# Patient Record
Sex: MALE | Race: WHITE
[De-identification: names, ages, dates, MRNs, and addresses within clinical notes are randomized per-mention and may not be internally consistent; named-entity substitution may affect disease eponyms.]

---

## 2017-02-06 ENCOUNTER — HOSPITAL ENCOUNTER (INPATIENT)
Dept: HOSPITAL 61 - ER | Age: 67
LOS: 3 days | Discharge: HOME | DRG: 189 | End: 2017-02-09
Attending: FAMILY MEDICINE | Admitting: FAMILY MEDICINE
Payer: COMMERCIAL

## 2017-02-06 VITALS — SYSTOLIC BLOOD PRESSURE: 136 MMHG | DIASTOLIC BLOOD PRESSURE: 73 MMHG

## 2017-02-06 VITALS — WEIGHT: 211.25 LBS | BODY MASS INDEX: 30.24 KG/M2 | HEIGHT: 70 IN

## 2017-02-06 DIAGNOSIS — Z88.1: ICD-10-CM

## 2017-02-06 DIAGNOSIS — Z88.8: ICD-10-CM

## 2017-02-06 DIAGNOSIS — Z79.4: ICD-10-CM

## 2017-02-06 DIAGNOSIS — I42.9: ICD-10-CM

## 2017-02-06 DIAGNOSIS — J96.01: Primary | ICD-10-CM

## 2017-02-06 DIAGNOSIS — F41.9: ICD-10-CM

## 2017-02-06 DIAGNOSIS — K21.9: ICD-10-CM

## 2017-02-06 DIAGNOSIS — Z79.01: ICD-10-CM

## 2017-02-06 DIAGNOSIS — Z95.1: ICD-10-CM

## 2017-02-06 DIAGNOSIS — E11.9: ICD-10-CM

## 2017-02-06 DIAGNOSIS — E78.00: ICD-10-CM

## 2017-02-06 DIAGNOSIS — F32.9: ICD-10-CM

## 2017-02-06 DIAGNOSIS — Z91.040: ICD-10-CM

## 2017-02-06 DIAGNOSIS — I48.91: ICD-10-CM

## 2017-02-06 DIAGNOSIS — Z72.0: ICD-10-CM

## 2017-02-06 DIAGNOSIS — Z98.890: ICD-10-CM

## 2017-02-06 DIAGNOSIS — Z80.6: ICD-10-CM

## 2017-02-06 DIAGNOSIS — E78.5: ICD-10-CM

## 2017-02-06 DIAGNOSIS — I50.9: ICD-10-CM

## 2017-02-06 DIAGNOSIS — J44.1: ICD-10-CM

## 2017-02-06 DIAGNOSIS — I25.10: ICD-10-CM

## 2017-02-06 DIAGNOSIS — Z82.49: ICD-10-CM

## 2017-02-06 DIAGNOSIS — I11.0: ICD-10-CM

## 2017-02-06 LAB
ALBUMIN SERPL-MCNC: 3.8 G/DL (ref 3.4–5)
ALP SERPL-CCNC: 76 U/L (ref 46–116)
ALT SERPL-CCNC: 23 U/L (ref 16–63)
ANION GAP SERPL CALC-SCNC: 12 MMOL/L (ref 6–14)
AST SERPL-CCNC: 26 U/L (ref 15–37)
BACTERIA #/AREA URNS HPF: 0 /HPF
BASOPHILS # BLD AUTO: 0.1 X10^3/UL (ref 0–0.2)
BASOPHILS NFR BLD: 1 % (ref 0–3)
BILIRUB DIRECT SERPL-MCNC: 0.2 MG/DL (ref 0–0.2)
BILIRUB SERPL-MCNC: 0.8 MG/DL (ref 0.2–1)
BILIRUB UR QL STRIP: NEGATIVE
BUN SERPL-MCNC: 20 MG/DL (ref 8–26)
CALCIUM SERPL-MCNC: 8.7 MG/DL (ref 8.5–10.1)
CHLORIDE SERPL-SCNC: 97 MMOL/L (ref 98–107)
CO2 SERPL-SCNC: 26 MMOL/L (ref 21–32)
CREAT SERPL-MCNC: 1.3 MG/DL (ref 0.7–1.3)
EOSINOPHIL NFR BLD: 5 % (ref 0–3)
ERYTHROCYTE [DISTWIDTH] IN BLOOD BY AUTOMATED COUNT: 17.4 % (ref 11.5–14.5)
GFR SERPLBLD BASED ON 1.73 SQ M-ARVRAT: 54.9 ML/MIN
GLUCOSE SERPL-MCNC: 162 MG/DL (ref 70–99)
GLUCOSE UR STRIP-MCNC: NEGATIVE MG/DL
HCT VFR BLD CALC: 41 % (ref 39–53)
HGB BLD-MCNC: 13.9 G/DL (ref 13–17.5)
LYMPHOCYTES # BLD: 0.8 X10^3/UL (ref 1–4.8)
LYMPHOCYTES NFR BLD AUTO: 9 % (ref 24–48)
MCH RBC QN AUTO: 28 PG (ref 25–35)
MCHC RBC AUTO-ENTMCNC: 34 G/DL (ref 31–37)
MCV RBC AUTO: 83 FL (ref 79–100)
MONOCYTES NFR BLD: 11 % (ref 0–9)
NEUTROPHILS NFR BLD AUTO: 74 % (ref 31–73)
NITRITE UR QL STRIP: NEGATIVE
PH UR STRIP: 6 [PH]
PLATELET # BLD AUTO: 196 X10^3/UL (ref 140–400)
POTASSIUM SERPL-SCNC: 4 MMOL/L (ref 3.5–5.1)
PROT SERPL-MCNC: 7.4 G/DL (ref 6.4–8.2)
PROT UR STRIP-MCNC: NEGATIVE MG/DL
RBC # BLD AUTO: 4.96 X10^6/UL (ref 4.3–5.7)
RBC #/AREA URNS HPF: 0 /HPF (ref 0–2)
SODIUM SERPL-SCNC: 135 MMOL/L (ref 136–145)
SP GR UR STRIP: <=1.005
SQUAMOUS #/AREA URNS LPF: (no result) /LPF
UROBILINOGEN UR-MCNC: 1 MG/DL
WBC # BLD AUTO: 9.4 X10^3/UL (ref 4–11)
WBC #/AREA URNS HPF: (no result) /HPF (ref 0–4)

## 2017-02-06 PROCEDURE — G0379 DIRECT REFER HOSPITAL OBSERV: HCPCS

## 2017-02-06 PROCEDURE — 80076 HEPATIC FUNCTION PANEL: CPT

## 2017-02-06 PROCEDURE — 85007 BL SMEAR W/DIFF WBC COUNT: CPT

## 2017-02-06 PROCEDURE — 81001 URINALYSIS AUTO W/SCOPE: CPT

## 2017-02-06 PROCEDURE — 94250: CPT

## 2017-02-06 PROCEDURE — 83880 ASSAY OF NATRIURETIC PEPTIDE: CPT

## 2017-02-06 PROCEDURE — 94620: CPT

## 2017-02-06 PROCEDURE — 94760 N-INVAS EAR/PLS OXIMETRY 1: CPT

## 2017-02-06 PROCEDURE — 36415 COLL VENOUS BLD VENIPUNCTURE: CPT

## 2017-02-06 PROCEDURE — G0378 HOSPITAL OBSERVATION PER HR: HCPCS

## 2017-02-06 PROCEDURE — 84484 ASSAY OF TROPONIN QUANT: CPT

## 2017-02-06 PROCEDURE — 82947 ASSAY GLUCOSE BLOOD QUANT: CPT

## 2017-02-06 PROCEDURE — 80048 BASIC METABOLIC PNL TOTAL CA: CPT

## 2017-02-06 PROCEDURE — 94640 AIRWAY INHALATION TREATMENT: CPT

## 2017-02-06 PROCEDURE — 71250 CT THORAX DX C-: CPT

## 2017-02-06 PROCEDURE — 85027 COMPLETE CBC AUTOMATED: CPT

## 2017-02-06 PROCEDURE — 83690 ASSAY OF LIPASE: CPT

## 2017-02-06 PROCEDURE — 71010: CPT

## 2017-02-06 PROCEDURE — 93005 ELECTROCARDIOGRAM TRACING: CPT

## 2017-02-06 PROCEDURE — 87804 INFLUENZA ASSAY W/OPTIC: CPT

## 2017-02-06 RX ADMIN — IPRATROPIUM BROMIDE AND ALBUTEROL SULFATE SCH ML: .5; 3 SOLUTION RESPIRATORY (INHALATION) at 19:47

## 2017-02-06 NOTE — PHYS DOC
Past Medical History


Past Medical History:  A-Fib, Anxiety, CHF, COPD, Depression, Diabetes-Type II, 

High Cholesterol, Hypertension


Past Surgical History:  Coronary Bypass Surgery, Other


Additional Past Surgical Histo:  bladder, hernia, rotator cuff


Alcohol Use:  None


Drug Use:  None





Adult General


Chief Complaint


Chief Complaint:  SHORTNESS OF BREATH





HPI


HPI


66-year-old male presenting to the emergency department today with worsening 

shortness of breath and cough over 3 days. He has a history of COPD but denies 

fevers or chills. Worse with exertion. He has a history of coronary artery 

disease. Location lungs. Duration intermittent. No alleviating factors present. 

Exacerbated by walking.





Review of systems is negative for chest pain abdominal pain nausea vomiting. He 

denies fevers or chills. All other review of systems is negative unless 

otherwise noted in history of present illness.





Review of Systems


Review of Systems


SEE ABOVE.





Current Medications


Current Medications





 Current Medications








 Medications


  (Trade)  Dose


 Ordered  Sig/Miguel Angel  Start Time


 Stop Time Status Last Admin


Dose Admin


 


 Albuterol/


 Ipratropium


  (Duoneb)  3 ml  RTQID  2/6/17 20:00


 2/7/17 19:59  2/6/17 19:47


3 ML


 


 Morphine Sulfate  2 mg  PRN Q2HR  PRN  2/6/17 19:30


 2/7/17 19:29   


 


 


 Nitroglycerin


  (Nitrostat)  0.4 mg  PRN Q5MIN  PRN  2/6/17 19:30


 2/7/17 19:29   


 


 


 Ondansetron HCl


  (Zofran)  4 mg  PRN Q8HRS  PRN  2/6/17 19:30


 2/7/17 19:29   


 


 


 Prednisone


  (Prednisone)  50 mg  1X  ONCE  2/6/17 19:30


 2/6/17 19:31 DC 2/6/17 19:45


50 MG











Allergies


Allergies





 Allergies








Coded Allergies Type Severity Reaction Last Updated Verified


 


  acetaminophen Allergy Intermediate  1/5/15 No


 


  hydrocodone Allergy Intermediate  1/5/15 No


 


  latex Allergy Intermediate itch 1/5/15 No


 


  oxycodone Allergy Intermediate  1/5/15 No











Physical Exam


Physical Exam





Constitutional: Well developed, well nourished, no acute distress, non-toxic 

appearance. 


HENT: Normocephalic, atraumatic, bilateral external ears normal, oropharynx 

moist, no oral exudates, nose normal. []


Eyes: PERRLA, EOMI, conjunctiva normal, no discharge. [] 


Neck: Normal range of motion, no tenderness, supple, no stridor. [] 


Cardiovascular:Heart rate regular rhythm, no murmur []


Lungs & Thorax:  Patient has diffuse wheezing bilaterally with out crackles 

present.


Abdomen: Bowel sounds normal, soft, no tenderness, no masses, no pulsatile 

masses.  


Skin: Warm, dry, no erythema, no rash.  


Back: No tenderness, no CVA tenderness. [] 


Extremities: No tenderness, no cyanosis, no clubbing, ROM intact, no edema.  


Neurologic: Alert and oriented X 3, normal motor function, normal sensory 

function, no focal deficits noted. []


Psychologic: Affect normal, judgement normal, mood normal.





Current Patient Data


Vital Signs





 Vital Signs








  Date Time  Temp Pulse Resp B/P Pulse Ox O2 Delivery O2 Flow Rate FiO2


 


2/6/17 18:18 98.2 76 26 187/81 79 Room Air  





 98.2       








Lab Values





 Laboratory Tests








Test


  2/6/17


18:30


 


White Blood Count


  9.4x10^3/uL


(4.0-11.0)


 


Red Blood Count


  4.96x10^6/uL


(4.30-5.70)


 


Hemoglobin


  13.9g/dL


(13.0-17.5)


 


Hematocrit


  41.0%


(39.0-53.0)


 


Mean Corpuscular Volume 83fL ()  


 


Mean Corpuscular Hemoglobin 28pg (25-35)  


 


Mean Corpuscular Hemoglobin


Concent 34g/dL (31-37)


 


 


Red Cell Distribution Width


  17.4%


(11.5-14.5)  H


 


Platelet Count


  196x10^3/uL


(140-400)


 


Neutrophils (%) (Auto) 74% (31-73)  H


 


Lymphocytes (%) (Auto) 9% (24-48)  L


 


Monocytes (%) (Auto) 11% (0-9)  H


 


Eosinophils (%) (Auto) 5% (0-3)  H


 


Basophils (%) (Auto) 1% (0-3)  


 


Neutrophils # (Auto)


  7.0x10^3uL


(1.8-7.7)


 


Lymphocytes # (Auto)


  0.8x10^3/uL


(1.0-4.8)  L


 


Monocytes # (Auto)


  1.0x10^3/uL


(0.0-1.1)


 


Eosinophils # (Auto)


  0.4x10^3/uL


(0.0-0.7)


 


Basophils # (Auto)


  0.1x10^3/uL


(0.0-0.2)


 


Sodium Level


  135mmol/L


(136-145)  L


 


Potassium Level


  4.0mmol/L


(3.5-5.1)


 


Chloride Level


  97mmol/L


()  L


 


Carbon Dioxide Level


  26mmol/L


(21-32)


 


Anion Gap 12 (6-14)  


 


Blood Urea Nitrogen


  20mg/dL (8-26)


 


 


Creatinine


  1.3mg/dL


(0.7-1.3)


 


Estimated GFR


(Cockcroft-Gault) 54.9  


 


 


Glucose Level


  162mg/dL


(70-99)  H


 


Calcium Level


  8.7mg/dL


(8.5-10.1)


 


Total Bilirubin


  0.8mg/dL


(0.2-1.0)


 


Direct Bilirubin


  0.2mg/dL


(0.0-0.2)


 


Aspartate Amino Transferase


(AST) 26U/L (15-37)  


 


 


Alanine Aminotransferase (ALT) 23U/L (16-63)  


 


Alkaline Phosphatase


  76U/L ()


 


 


Troponin I Quantitative


  < 0.017ng/mL


(0.000-0.055)


 


NT-Pro-B-Type Natriuretic


Peptide 854pg/mL


(0-124)  H


 


Total Protein


  7.4g/dL


(6.4-8.2)


 


Albumin


  3.8g/dL


(3.4-5.0)


 


Lipase


  66U/L ()


L





 Laboratory Tests


2/6/17 18:30








 Laboratory Tests


2/6/17 18:30














EKG


EKG


[] EKG shows sinus rhythm with a regular rate. Axis is leftward. Intervals are 

within normal limits. Patient has mild repolarization in lead V3 likely 

secondary to deep S wave.





Radiology/Procedures


Radiology/Procedures


[] Chest x-ray shows no acute obvious infiltrate or pneumothorax present.





Course & Med Decision Making


Course & Med Decision Making


Pertinent Labs and Imaging studies reviewed. (See chart for details)





[] 58-year-old male presenting to the emergency department with shortness of 

breath with wheezing on exam. Afebrile with normal heart rate. Patient was 

given DuoNeb therapy in the emergency department which improved his breathing 

mildly however he remained hypoxic and was subsequently admitted for further 

evaluation workup and care. EKG not suggestive of ischemia. Chest x-ray 

unremarkable. Blood work shows normal CBC. Chemistry panel shows mild elevation 

in proBNP. Troponin negative.





Dragon Disclaimer


Dragon Disclaimer


This electronic medical record was generated, in whole or in part, using a 

voice recognition dictation system.





Departure


Departure


Impression:  


 Primary Impression:  


 COPD exacerbation


Disposition:  09 ADMITTED AS INPATIENT


Admitting Physician:  Sai Hugo


Condition:  STABLE


Referrals:  


SAI HUGO MD (PCP)








ABDOUL HAMLIN MD Feb 6, 2017 20:02

## 2017-02-07 VITALS — DIASTOLIC BLOOD PRESSURE: 74 MMHG | SYSTOLIC BLOOD PRESSURE: 158 MMHG

## 2017-02-07 VITALS — SYSTOLIC BLOOD PRESSURE: 150 MMHG | DIASTOLIC BLOOD PRESSURE: 97 MMHG

## 2017-02-07 VITALS — SYSTOLIC BLOOD PRESSURE: 142 MMHG | DIASTOLIC BLOOD PRESSURE: 73 MMHG

## 2017-02-07 VITALS — DIASTOLIC BLOOD PRESSURE: 83 MMHG | SYSTOLIC BLOOD PRESSURE: 162 MMHG

## 2017-02-07 VITALS — DIASTOLIC BLOOD PRESSURE: 67 MMHG | SYSTOLIC BLOOD PRESSURE: 146 MMHG

## 2017-02-07 VITALS — DIASTOLIC BLOOD PRESSURE: 74 MMHG | SYSTOLIC BLOOD PRESSURE: 162 MMHG

## 2017-02-07 LAB
ANION GAP SERPL CALC-SCNC: 11 MMOL/L (ref 6–14)
ANISOCYTOSIS BLD QL SMEAR: SLIGHT
BASOPHILS # BLD AUTO: 0.1 X10^3/UL (ref 0–0.2)
BASOPHILS NFR BLD AUTO: 1 % (ref 0–3)
BASOPHILS NFR BLD: 1 % (ref 0–3)
BUN SERPL-MCNC: 21 MG/DL (ref 8–26)
CALCIUM SERPL-MCNC: 8.9 MG/DL (ref 8.5–10.1)
CHLORIDE SERPL-SCNC: 97 MMOL/L (ref 98–107)
CO2 SERPL-SCNC: 28 MMOL/L (ref 21–32)
CREAT SERPL-MCNC: 1.3 MG/DL (ref 0.7–1.3)
EOSINOPHIL NFR BLD: 0 % (ref 0–3)
ERYTHROCYTE [DISTWIDTH] IN BLOOD BY AUTOMATED COUNT: 17.8 % (ref 11.5–14.5)
GFR SERPLBLD BASED ON 1.73 SQ M-ARVRAT: 54.9 ML/MIN
GLUCOSE SERPL-MCNC: 287 MG/DL (ref 70–99)
HCT VFR BLD CALC: 42.6 % (ref 39–53)
HGB BLD-MCNC: 14.3 G/DL (ref 13–17.5)
LYMPHOCYTES # BLD: 0.3 X10^3/UL (ref 1–4.8)
LYMPHOCYTES NFR BLD AUTO: 5 % (ref 24–48)
MCH RBC QN AUTO: 28 PG (ref 25–35)
MCHC RBC AUTO-ENTMCNC: 34 G/DL (ref 31–37)
MCV RBC AUTO: 83 FL (ref 79–100)
MONOCYTES NFR BLD: 2 % (ref 0–9)
NEUTROPHILS NFR BLD AUTO: 92 % (ref 31–73)
OBC FLU: (no result)
PLATELET # BLD AUTO: 194 X10^3/UL (ref 140–400)
PLATELET # BLD EST: ADEQUATE 10*3/UL
POTASSIUM SERPL-SCNC: 4.3 MMOL/L (ref 3.5–5.1)
RBC # BLD AUTO: 5.12 X10^6/UL (ref 4.3–5.7)
SODIUM SERPL-SCNC: 136 MMOL/L (ref 136–145)
WBC # BLD AUTO: 7 X10^3/UL (ref 4–11)

## 2017-02-07 RX ADMIN — PANTOPRAZOLE SODIUM SCH MG: 40 TABLET, DELAYED RELEASE ORAL at 09:03

## 2017-02-07 RX ADMIN — METFORMIN HYDROCHLORIDE SCH MG: 500 TABLET, FILM COATED ORAL at 21:13

## 2017-02-07 RX ADMIN — TAMSULOSIN HYDROCHLORIDE SCH MG: 0.4 CAPSULE ORAL at 21:13

## 2017-02-07 RX ADMIN — INSULIN ASPART SCH UNITS: 100 INJECTION, SOLUTION INTRAVENOUS; SUBCUTANEOUS at 17:00

## 2017-02-07 RX ADMIN — LISINOPRIL SCH MG: 40 TABLET ORAL at 09:07

## 2017-02-07 RX ADMIN — IPRATROPIUM BROMIDE AND ALBUTEROL SULFATE SCH ML: .5; 3 SOLUTION RESPIRATORY (INHALATION) at 16:45

## 2017-02-07 RX ADMIN — INSULIN ASPART SCH UNITS: 100 INJECTION, SOLUTION INTRAVENOUS; SUBCUTANEOUS at 13:42

## 2017-02-07 RX ADMIN — ATORVASTATIN CALCIUM SCH MG: 10 TABLET, FILM COATED ORAL at 21:13

## 2017-02-07 RX ADMIN — ALPRAZOLAM PRN MG: 0.5 TABLET ORAL at 00:32

## 2017-02-07 RX ADMIN — ALPRAZOLAM PRN MG: 0.5 TABLET ORAL at 21:13

## 2017-02-07 RX ADMIN — METFORMIN HYDROCHLORIDE SCH MG: 500 TABLET, FILM COATED ORAL at 13:37

## 2017-02-07 RX ADMIN — METFORMIN HYDROCHLORIDE SCH MG: 500 TABLET, FILM COATED ORAL at 09:05

## 2017-02-07 RX ADMIN — DILTIAZEM HYDROCHLORIDE SCH MG: 180 CAPSULE, EXTENDED RELEASE ORAL at 09:05

## 2017-02-07 RX ADMIN — Medication SCH MG: at 09:06

## 2017-02-07 RX ADMIN — DILTIAZEM HYDROCHLORIDE SCH MG: 180 CAPSULE, EXTENDED RELEASE ORAL at 21:13

## 2017-02-07 RX ADMIN — SERTRALINE HYDROCHLORIDE SCH MG: 50 TABLET ORAL at 09:07

## 2017-02-07 RX ADMIN — IPRATROPIUM BROMIDE AND ALBUTEROL SULFATE SCH ML: .5; 3 SOLUTION RESPIRATORY (INHALATION) at 09:15

## 2017-02-07 RX ADMIN — AMLODIPINE BESYLATE SCH MG: 5 TABLET ORAL at 10:23

## 2017-02-07 RX ADMIN — ARIPIPRAZOLE SCH MG: 2 TABLET ORAL at 09:04

## 2017-02-07 RX ADMIN — ASPIRIN 81 MG CHEWABLE TABLET SCH MG: 81 TABLET CHEWABLE at 09:05

## 2017-02-07 RX ADMIN — IPRATROPIUM BROMIDE AND ALBUTEROL SULFATE SCH ML: .5; 3 SOLUTION RESPIRATORY (INHALATION) at 19:55

## 2017-02-07 RX ADMIN — INSULIN DETEMIR SCH UNITS: 100 INJECTION, SOLUTION SUBCUTANEOUS at 21:13

## 2017-02-07 RX ADMIN — IPRATROPIUM BROMIDE AND ALBUTEROL SULFATE SCH ML: .5; 3 SOLUTION RESPIRATORY (INHALATION) at 12:08

## 2017-02-07 RX ADMIN — ALPRAZOLAM PRN MG: 0.5 TABLET ORAL at 10:23

## 2017-02-07 NOTE — EKG
[facility]

               [address] [place] [address]

Test Date:    2017               Test Time:    18:20:51

Pat Name:     URIAH GARCÍA             Department:   

Patient ID:   [id number]           Room:         [facility]

Gender:       M                        Technician:   

:          [id number]               Requested By: ABDOUL HAMLIN

Order Number: [id number]            Reading MD:   Ivory Catherine

                                 Measurements

Intervals                              Axis          

Rate:         75                       P:            

NC:                                    QRS:          -11

QRSD:         98                       T:            126

QT:           372                                    

QTc:          418                                    

                           Interpretive Statements

ATRIAL FIBRILLATION

LEFTWARD AXIS

 ABNORMAL ECG





Electronically Signed On 2017 14:29:05 CST by Ivory Catherine

## 2017-02-07 NOTE — PDOC
PROGRESS NOTES


Subjective


Subjective


Pt awake and pleasant this am. States his SOB has improved from yesterday. Pt 

states he is eating and drinking well with good output.





Objective


Objective


Pt awake and alert. NAD. VSS. Afebrile. Lungs diminished. Expiratory wheeze 

resolved. Heart with RRR. No murmurs.


 Vital Signs








  Date Time  Temp Pulse Resp B/P Pulse Ox O2 Delivery O2 Flow Rate FiO2


 


2/7/17 10:23  81  150/97    


 


2/7/17 08:26     96 Nasal Cannula 3.0 


 


2/7/17 07:00 97.5  18     





 97.5       














 Intake and Output 


 


 2/7/17





 07:00


 


Intake Total 840 ml


 


Balance 840 ml


 


 


 


Intake Oral 840 ml


 


# Voids 1











Assessment


Assessment


 Problems


Medical Problems:


(1) COPD exacerbation


Status: Acute  





(2) SOB (shortness of breath)


Status: Acute  











Plan


Plan of Care


1. COPD exacerbation


   -Pulmonary consulted


   -CXR: No acute finding apparent in the chest


   -WBC 7.0


   -Prednisone 50mg x1 in ER


   -Duoneb q6h


   -O2 per NC





2. Cardiomyapathy, follows Dr Carlton as outpt


   -Cardiology consulted


   -Home meds restarted


   


3. DM


   -FSBS 335 this am


   -Home meds restarted





Comment


Review of Relevant


I have reviewed the following items angi (where applicable) has been applied.


Labs





Laboratory Tests








Test


  2/6/17


18:30 2/6/17


19:41 2/6/17


22:54 2/7/17


01:32


 


White Blood Count


  9.4x10^3/uL


(4.0-11.0) 


  


  7.0x10^3/uL


(4.0-11.0)


 


Red Blood Count


  4.96x10^6/uL


(4.30-5.70) 


  


  5.12x10^6/uL


(4.30-5.70)


 


Hemoglobin


  13.9g/dL


(13.0-17.5) 


  


  14.3g/dL


(13.0-17.5)


 


Hematocrit


  41.0%


(39.0-53.0) 


  


  42.6%


(39.0-53.0)


 


Mean Corpuscular Volume 83fL ()    83fL () 


 


Mean Corpuscular Hemoglobin 28pg (25-35)    28pg (25-35) 


 


Mean Corpuscular Hemoglobin


Concent 34g/dL (31-37) 


  


  


  34g/dL (31-37) 


 


 


Red Cell Distribution Width


  17.4%


(11.5-14.5) 


  


  17.8%


(11.5-14.5)


 


Platelet Count


  196x10^3/uL


(140-400) 


  


  194x10^3/uL


(140-400)


 


Neutrophils (%) (Auto) 74% (31-73)    92% (31-73) 


 


Lymphocytes (%) (Auto) 9% (24-48)    5% (24-48) 


 


Monocytes (%) (Auto) 11% (0-9)    2% (0-9) 


 


Eosinophils (%) (Auto) 5% (0-3)    0% (0-3) 


 


Basophils (%) (Auto) 1% (0-3)    1% (0-3) 


 


Neutrophils # (Auto)


  7.0x10^3uL


(1.8-7.7) 


  


  6.4x10^3uL


(1.8-7.7)


 


Lymphocytes # (Auto)


  0.8x10^3/uL


(1.0-4.8) 


  


  0.3x10^3/uL


(1.0-4.8)


 


Monocytes # (Auto)


  1.0x10^3/uL


(0.0-1.1) 


  


  0.1x10^3/uL


(0.0-1.1)


 


Eosinophils # (Auto)


  0.4x10^3/uL


(0.0-0.7) 


  


  0.0x10^3/uL


(0.0-0.7)


 


Basophils # (Auto)


  0.1x10^3/uL


(0.0-0.2) 


  


  0.1x10^3/uL


(0.0-0.2)


 


Sodium Level


  135mmol/L


(136-145) 


  


  136mmol/L


(136-145)


 


Potassium Level


  4.0mmol/L


(3.5-5.1) 


  


  4.3mmol/L


(3.5-5.1)


 


Chloride Level


  97mmol/L


() 


  


  97mmol/L


()


 


Carbon Dioxide Level


  26mmol/L


(21-32) 


  


  28mmol/L


(21-32)


 


Anion Gap 12 (6-14)    11 (6-14) 


 


Blood Urea Nitrogen 20mg/dL (8-26)    21mg/dL (8-26) 


 


Creatinine


  1.3mg/dL


(0.7-1.3) 


  


  1.3mg/dL


(0.7-1.3)


 


Estimated GFR


(Cockcroft-Gault) 54.9 


  


  


  54.9 


 


 


Glucose Level


  162mg/dL


(70-99) 


  


  287mg/dL


(70-99)


 


Calcium Level


  8.7mg/dL


(8.5-10.1) 


  


  8.9mg/dL


(8.5-10.1)


 


Total Bilirubin


  0.8mg/dL


(0.2-1.0) 


  


  


 


 


Direct Bilirubin


  0.2mg/dL


(0.0-0.2) 


  


  


 


 


Aspartate Amino Transf


(AST/SGOT) 26U/L (15-37) 


  


  


  


 


 


Alanine Aminotransferase


(ALT/SGPT) 23U/L (16-63) 


  


  


  


 


 


Alkaline Phosphatase 76U/L ()    


 


Troponin I Quantitative


  < 0.017ng/mL


(0.000-0.055) 


  


  < 0.017ng/mL


(0.000-0.055)


 


NT-Pro-B-Type Natriuretic


Peptide 854pg/mL


(0-124) 


  


  


 


 


Total Protein


  7.4g/dL


(6.4-8.2) 


  


  


 


 


Albumin


  3.8g/dL


(3.4-5.0) 


  


  


 


 


Lipase 66U/L ()    


 


Urine Color  Yellow   


 


Urine Clarity  Clear   


 


Urine pH  6.0   


 


Urine Specific Gravity  <=1.005   


 


Urine Protein


  


  Negativemg/dL


(NEG-TRACE) 


  


 


 


Urine Glucose (UA)


  


  Negativemg/dL


(NEG) 


  


 


 


Urine Ketones (Stick)


  


  Negativemg/dL


(NEG) 


  


 


 


Urine Blood  Negative (NEG)   


 


Urine Nitrite  Negative (NEG)   


 


Urine Bilirubin  Negative (NEG)   


 


Urine Urobilinogen Dipstick


  


  1.0mg/dL (0.2


mg/dL) 


  


 


 


Urine Leukocyte Esterase  Negative (NEG)   


 


Urine RBC  0/HPF (0-2)   


 


Urine WBC  Occ/HPF (0-4)   


 


Urine Squamous Epithelial


Cells 


  Occ/LPF 


  


  


 


 


Urine Bacteria  0/HPF (0-FEW)   


 


Glucose (Fingerstick)


  


  


  138mg/dL


(70-99) 


 


 


Segmented Neutrophils %    80% (35-66) 


 


Band Neutrophils %    4% (0-9) 


 


Lymphocytes %    10% (24-48) 


 


Monocytes %    5% (0-10) 


 


Basophils %    1% (0-3) 


 


Platelet Estimate


  


  


  


  Adequate


(ADEQUATE)


 


Anisocytosis    Slight 














Test


  2/7/17


07:28 2/7/17


07:54 2/7/17


09:01 


 


 


Troponin I Quantitative


  < 0.017ng/mL


(0.000-0.055) 


  


  


 


 


Glucose (Fingerstick)


  


  335mg/dL


(70-99) 


  


 


 


Influenza Type A Antigen


  


  


  Negative


(NEGATIVE) 


 


 


Influenza Type B Antigen


  


  


  Negative


(NEGATIVE) 


 








Laboratory Tests








Test


  2/6/17


18:30 2/6/17


19:41 2/6/17


22:54 2/7/17


01:32


 


White Blood Count


  9.4x10^3/uL


(4.0-11.0) 


  


  7.0x10^3/uL


(4.0-11.0)


 


Red Blood Count


  4.96x10^6/uL


(4.30-5.70) 


  


  5.12x10^6/uL


(4.30-5.70)


 


Hemoglobin


  13.9g/dL


(13.0-17.5) 


  


  14.3g/dL


(13.0-17.5)


 


Hematocrit


  41.0%


(39.0-53.0) 


  


  42.6%


(39.0-53.0)


 


Mean Corpuscular Volume 83fL ()    83fL () 


 


Mean Corpuscular Hemoglobin 28pg (25-35)    28pg (25-35) 


 


Mean Corpuscular Hemoglobin


Concent 34g/dL (31-37) 


  


  


  34g/dL (31-37) 


 


 


Red Cell Distribution Width


  17.4%


(11.5-14.5) 


  


  17.8%


(11.5-14.5)


 


Platelet Count


  196x10^3/uL


(140-400) 


  


  194x10^3/uL


(140-400)


 


Neutrophils (%) (Auto) 74% (31-73)    92% (31-73) 


 


Lymphocytes (%) (Auto) 9% (24-48)    5% (24-48) 


 


Monocytes (%) (Auto) 11% (0-9)    2% (0-9) 


 


Eosinophils (%) (Auto) 5% (0-3)    0% (0-3) 


 


Basophils (%) (Auto) 1% (0-3)    1% (0-3) 


 


Neutrophils # (Auto)


  7.0x10^3uL


(1.8-7.7) 


  


  6.4x10^3uL


(1.8-7.7)


 


Lymphocytes # (Auto)


  0.8x10^3/uL


(1.0-4.8) 


  


  0.3x10^3/uL


(1.0-4.8)


 


Monocytes # (Auto)


  1.0x10^3/uL


(0.0-1.1) 


  


  0.1x10^3/uL


(0.0-1.1)


 


Eosinophils # (Auto)


  0.4x10^3/uL


(0.0-0.7) 


  


  0.0x10^3/uL


(0.0-0.7)


 


Basophils # (Auto)


  0.1x10^3/uL


(0.0-0.2) 


  


  0.1x10^3/uL


(0.0-0.2)


 


Sodium Level


  135mmol/L


(136-145) 


  


  136mmol/L


(136-145)


 


Potassium Level


  4.0mmol/L


(3.5-5.1) 


  


  4.3mmol/L


(3.5-5.1)


 


Chloride Level


  97mmol/L


() 


  


  97mmol/L


()


 


Carbon Dioxide Level


  26mmol/L


(21-32) 


  


  28mmol/L


(21-32)


 


Anion Gap 12 (6-14)    11 (6-14) 


 


Blood Urea Nitrogen 20mg/dL (8-26)    21mg/dL (8-26) 


 


Creatinine


  1.3mg/dL


(0.7-1.3) 


  


  1.3mg/dL


(0.7-1.3)


 


Estimated GFR


(Cockcroft-Gault) 54.9 


  


  


  54.9 


 


 


Glucose Level


  162mg/dL


(70-99) 


  


  287mg/dL


(70-99)


 


Calcium Level


  8.7mg/dL


(8.5-10.1) 


  


  8.9mg/dL


(8.5-10.1)


 


Total Bilirubin


  0.8mg/dL


(0.2-1.0) 


  


  


 


 


Direct Bilirubin


  0.2mg/dL


(0.0-0.2) 


  


  


 


 


Aspartate Amino Transf


(AST/SGOT) 26U/L (15-37) 


  


  


  


 


 


Alanine Aminotransferase


(ALT/SGPT) 23U/L (16-63) 


  


  


  


 


 


Alkaline Phosphatase 76U/L ()    


 


Troponin I Quantitative


  < 0.017ng/mL


(0.000-0.055) 


  


  < 0.017ng/mL


(0.000-0.055)


 


NT-Pro-B-Type Natriuretic


Peptide 854pg/mL


(0-124) 


  


  


 


 


Total Protein


  7.4g/dL


(6.4-8.2) 


  


  


 


 


Albumin


  3.8g/dL


(3.4-5.0) 


  


  


 


 


Lipase 66U/L ()    


 


Urine Color  Yellow   


 


Urine Clarity  Clear   


 


Urine pH  6.0   


 


Urine Specific Gravity  <=1.005   


 


Urine Protein


  


  Negativemg/dL


(NEG-TRACE) 


  


 


 


Urine Glucose (UA)


  


  Negativemg/dL


(NEG) 


  


 


 


Urine Ketones (Stick)


  


  Negativemg/dL


(NEG) 


  


 


 


Urine Blood  Negative (NEG)   


 


Urine Nitrite  Negative (NEG)   


 


Urine Bilirubin  Negative (NEG)   


 


Urine Urobilinogen Dipstick


  


  1.0mg/dL (0.2


mg/dL) 


  


 


 


Urine Leukocyte Esterase  Negative (NEG)   


 


Urine RBC  0/HPF (0-2)   


 


Urine WBC  Occ/HPF (0-4)   


 


Urine Squamous Epithelial


Cells 


  Occ/LPF 


  


  


 


 


Urine Bacteria  0/HPF (0-FEW)   


 


Glucose (Fingerstick)


  


  


  138mg/dL


(70-99) 


 


 


Segmented Neutrophils %    80% (35-66) 


 


Band Neutrophils %    4% (0-9) 


 


Lymphocytes %    10% (24-48) 


 


Monocytes %    5% (0-10) 


 


Basophils %    1% (0-3) 


 


Platelet Estimate


  


  


  


  Adequate


(ADEQUATE)


 


Anisocytosis    Slight 














Test


  2/7/17


07:28 2/7/17


07:54 2/7/17


09:01 


 


 


Troponin I Quantitative


  < 0.017ng/mL


(0.000-0.055) 


  


  


 


 


Glucose (Fingerstick)


  


  335mg/dL


(70-99) 


  


 


 


Influenza Type A Antigen


  


  


  Negative


(NEGATIVE) 


 


 


Influenza Type B Antigen


  


  


  Negative


(NEGATIVE) 


 








Medications





 Current Medications


Albuterol/ Ipratropium (Duoneb) 3 ml 1X  ONCE NEB  Last administered on 2/6/ 17at 19:47;  Start 2/6/17 at 19:30;  Stop 2/6/17 at 19:31;  Status DC


Prednisone (Prednisone) 50 mg 1X  ONCE PO  Last administered on 2/6/17at 19:45;

  Start 2/6/17 at 19:30;  Stop 2/6/17 at 19:31;  Status DC


Ondansetron HCl (Zofran) 4 mg PRN Q8HRS  PRN IV NAUSEA/VOMITING;  Start 2/6/17 

at 19:30;  Stop 2/7/17 at 19:29


Morphine Sulfate 2 mg PRN Q2HR  PRN IV PAIN;  Start 2/6/17 at 19:30;  Stop 2/7/ 17 at 19:29


Nitroglycerin (Nitrostat) 0.4 mg PRN Q5MIN  PRN SL CHEST PAIN;  Start 2/6/17 at 

19:30;  Stop 2/7/17 at 19:29


Albuterol/ Ipratropium (Duoneb) 3 ml RTQID NEB  Last administered on 2/7/17at 09

:15;  Start 2/6/17 at 20:00;  Stop 2/7/17 at 19:59


Alprazolam (Xanax) 0.5 mg PRN Q8HRS  PRN PO ANXIETY / AGITATION Last 

administered on 2/7/17at 10:23;  Start 2/7/17 at 00:30


Aripiprazole (Abilify) 2 mg DAILY PO  Last administered on 2/7/17at 09:04;  

Start 2/7/17 at 09:00


Aspirin (Children'S Aspirin) 81 mg DAILY PO  Last administered on 2/7/17at 09:05

;  Start 2/7/17 at 09:00


Digoxin (Lanoxin) 250 mcg DAILY PO  Last administered on 2/7/17at 09:06;  Start 

2/7/17 at 09:00


Diltiazem HCl (Cardizem 24hr Cd) 180 mg BID PO  Last administered on 2/7/17at 09

:05;  Start 2/7/17 at 09:00


Furosemide (Lasix) 40 mg DAILY PO  Last administered on 2/7/17at 09:06;  Start 2 /7/17 at 09:00


Lisinopril (Prinivil) 40 mg DAILY PO  Last administered on 2/7/17at 09:07;  

Start 2/7/17 at 09:00


Metformin HCl (Glucophage) 500 mg TID PO  Last administered on 2/7/17at 09:05;  

Start 2/7/17 at 09:00


Sertraline HCl (Zoloft) 50 mg DAILY PO  Last administered on 2/7/17at 09:07;  

Start 2/7/17 at 09:00


Insulin Detemir (Levemir) 60 units QHS SQ ;  Start 2/7/17 at 21:00


Pantoprazole Sodium (Protonix) 40 mg DAILYAC PO  Last administered on 2/7/17at 

09:03;  Start 2/7/17 at 07:30


Atorvastatin Calcium (Lipitor) 5 mg QHS PO ;  Start 2/7/17 at 21:00


Tamsulosin HCl (Flomax) 0.4 mg QHS PO ;  Start 2/7/17 at 21:00


Amlodipine Besylate (Norvasc) 5 mg DAILY PO  Last administered on 2/7/17at 10:23

;  Start 2/7/17 at 10:00





Active Scripts


Active


Abilify (Aripiprazole) 2 Mg Tablet 2 Mg PO DAILY


Reported


Amlodipine Besylate 5 Mg Tablet 5 Mg PO DAILY


Lantus Solostar (Insulin Glargine,Hum.rec.anlog) 100 Unit/1 Ml Insuln.pen 60 

Unit SQ QHS


Pravastatin Sodium 20 Mg Tablet 1 Tab PO DAILY


Omeprazole 20 Mg Capsule.dr 1 Cap PO BID


Zoloft (Sertraline Hcl) 50 Mg Tablet 1 Tab PO DAILY


Furosemide 40 Mg Tablet 1 Tab PO DAILY


Cardizem Cd (Diltiazem Hcl) 180 Mg Cap.er.24h 180 Mg PO BID


Digoxin 250 Mcg Tablet 250 Mcg PO DAILY


Proair Hfa Inhaler (Albuterol Sulfate) 8.5 Gm Hfa.aer.ad 8.5 Gm IH PRN TID PRN


Symbicort 160-4.5 Mcg Inhaler (Budesonide/Formoterol Fumarate) 10.2 Gm 

Hfa.aer.ad 10.2 Gm IH BID


NITROGLYCERIN SubLingual (Nitroglycerin) 0.4 Mg Tab.subl 0.4 Mg SL PRN Q5MIN PRN


Alprazolam 0.5 Mg Tab.rapdis 0.5 Mg PO PRN TID PRN


Indigo Chewable (Aspirin) 81 Mg Tab.chew 81 Mg PO DAILY


Metformin Hcl 500 Mg Tablet 500 Mg PO TID


Rapaflo (Silodosin) 8 Mg Capsule 8 Mg PO DAILY


Lisinopril 40 Mg Tablet 40 Mg PO DAILY


Vitals/I & O





 Vital Sign - Last 24 Hours








 2/6/17 2/6/17 2/6/17 2/6/17





 18:18 18:30 19:30 19:50


 


Temp 98.2   





 98.2   


 


Pulse 76 82 80 


 


Resp 26 20 20 


 


B/P 187/81 172/80 171/87 


 


Pulse Ox 79 91 90 90


 


O2 Delivery Room Air Nasal Cannula Nasal Cannula Nasal Cannula


 


O2 Flow Rate  4 4 3.0


 


    





    





 2/6/17 2/6/17 2/6/17 2/6/17





 20:30 21:30 22:00 23:00


 


Pulse 86 82 78 


 


Resp 20 20 18 


 


B/P 159/81 154/72 168/72 


 


Pulse Ox 90 91 93 


 


O2 Delivery Nasal Cannula Room Air Nasal Cannula Nasal Cannula


 


O2 Flow Rate 4  4 5.0





 2/6/17 2/6/17 2/7/17 2/7/17





 23:00 23:00 03:01 07:00


 


Temp 98.5  98.0 97.5





 98.5  98.0 97.5


 


Pulse 87  89 81


 


Resp 20 20 18


 


B/P 136/73  162/83 150/97


 


Pulse Ox 94  94 94


 


O2 Delivery Nasal Cannula Nasal Cannula Nasal Cannula Nasal Cannula


 


O2 Flow Rate  5.0  5.0


 


    





    





 2/7/17 2/7/17 2/7/17 2/7/17





 08:00 08:26 09:05 09:06


 


Pulse   81 81


 


B/P   150/97 150/97


 


Pulse Ox  96  


 


O2 Delivery Nasal Cannula Nasal Cannula  


 


O2 Flow Rate 4.0 3.0  





 2/7/17 2/7/17  





 09:07 10:23  


 


Pulse 81 81  


 


B/P 150/97 150/97  














 Intake and Output   


 


 2/6/17 2/6/17 2/7/17





 15:00 23:00 07:00


 


Intake Total   840 ml


 


Balance   840 ml














YVETTE JENNINGS MD Feb 7, 2017 10:57

## 2017-02-07 NOTE — CONS
DATE OF CONSULTATION:  02/07/2017



ATTENDING PHYSICIAN:  Dr. Sai Hugo.



REASON FOR CONSULTATION:  Dyspnea.



HISTORY OF PRESENT ILLNESS:  The patient is a 66-year-old male, who has smoked

heavily in the past for 35 years up to 3 packs per day.  He did quit in 2009. 

He is not on home oxygen.  He presented to the hospital with complaint of

shortness of breath.  He has a cough and had some sinus drainage.  The patient

states his grandson was ill, was sick with a Strep throat and he got it from

him.  He was having some wheezing as well.  No chest pains.  No leg edema.  No

nausea or vomiting.  No diarrhea.  No headaches.  His chest x-ray was clear.  He

has been hospitalized for further evaluation.



PAST MEDICAL HISTORY:  History of COPD, history of AFib.  He developed

hemoptysis while on anticoagulation and had bronchoscopies in the past.  History

of depression, type 2 diabetes, dyslipidemia, and hypertension.



PAST SURGICAL HISTORY:  Coronary artery bypass surgery; bladder, hernia and

rotator cuff surgery.



ALLERGIES:  ACETAMINOPHEN, HYDROCODONE, LATEX, AND OXYCODONE.



MEDICATIONS:  Were all reviewed as listed in the [facility] including DuoNebs.



REVIEW OF SYSTEMS:  Twelve-point systems was obtained.  Pertinent positives

discussed in history of present illness, otherwise noncontributory.  All systems

that were negative were reviewed as well.



SOCIAL HISTORY:  He smoked for 35 years up to 3 packs a day.  Quit in 2009.



FAMILY HISTORY:  Noncontributory ____.



PHYSICAL EXAMINATION:

VITAL SIGNS:  Blood pressure 150/97.  He is afebrile, pulse ox 94% on 5 liters.

HEENT:  Sclerae nonicteric.

NECK:  Supple.

LUNGS:  With diminished breath sounds with occasional posterior wheeze.

CARDIOVASCULAR:  Regular rate, rhythm.

ABDOMEN:  Soft, obese.

EXTREMITIES:  With no pitting edema.



LABORATORY DATA:  Reviewed.  BUN and creatinine are 21/1.3, blood sugar 335,

white cell count 7.0.



IMPRESSION:

1.  Dyspnea with acute hypoxic respiratory failure secondary to acute

exacerbation of chronic obstructive pulmonary disease.

2.  Thirty-five years of tobacco use up to 3 packs per day.  Suspect underlying

chronic obstructive pulmonary disease.

3.  History of atrial fibrillation, was on anticoagulation, but developed

significant hemostasis, and as a result was taken off.  He had bronchoscopies

done in the past as well.

4.  Rule out influenza.



RECOMMENDATIONS:

1.  Continue to slowly wean oxygen with keeping sats 92% to 94%.

2.  Rule out influenza.

3.  Continue bronchodilators.

4.  We can hold on systemic steroids at present.  Lungs are sounding better.

5.  Weight loss is advised.

6.  We will follow along with you.

 



______________________________

ELEONORA ROUSE MD



DR:  DAISY/tejinder  JOB#:  [id number] / [id number]

DD:  02/07/2017 08:48  DT:  02/07/2017 09:25



Sai Vera

## 2017-02-07 NOTE — PDOC2
CONSULT


Date of Consult


Date of Consult


DATE: 17 


TIME: 10:25





Reason for Consult


Reason for Consult:


CHF





Identification/Chief Complaint


Chief Complaint


Shortness of breath





Source


Source:  Chart review, Patient





History of Present Illness


Reason for Visit:


Mr. Pan is a 67 y/o M who presents with the chief complaint of shortness of 

breath that has lasted for 4 days. He says his dyspnea is worse with exertion 

and only better with O2 therapy. He has a known history of COPD and says this 

feels similar to previous COPD exacerbations. He further reports that he has 

had several episodes like this one since having a CABG in . He has also 

been coughing up dark brown phlegm and endorses some weakness. He denies fevers

, chills, and chest pain.





Past Medical History


Past Medical History


1. A-Fib, 2. MISSY, 3. CHF, 4. COPD, 5. MDD, 6. T2DM, 7. HLD, 8. HTN





Past Surgical History


Past Surgical History


1. CABG in , 2. Hernia repair, 3. Rotator cuff repair.





Family History


Family History


Patient's father  of an MI in  at age 69. His mother  of Leukemia 

in  at the age of 53.





Social History


Quit


ALCOHOL:  none


Drugs:  None





Current Problem List


Problem List


 Problems


Medical Problems:


(1) COPD exacerbation


Status: Acute  





(2) SOB (shortness of breath)


Status: Acute  











Current Medications


Current Medications





 Current Medications


Albuterol/ Ipratropium (Duoneb) 3 ml 1X  ONCE NEB  Last administered on  19:47;  Start 17 at 19:30;  Stop 17 at 19:31;  Status DC


Prednisone (Prednisone) 50 mg 1X  ONCE PO  Last administered on  19:45;

  Start 17 at 19:30;  Stop 17 at 19:31;  Status DC


Ondansetron HCl (Zofran) 4 mg PRN Q8HRS  PRN IV NAUSEA/VOMITING;  Start 17 

at 19:30;  Stop 17 at 19:29


Morphine Sulfate 2 mg PRN Q2HR  PRN IV PAIN;  Start 17 at 19:30;  Stop  at 19:29


Nitroglycerin (Nitrostat) 0.4 mg PRN Q5MIN  PRN SL CHEST PAIN;  Start 17 at 

19:30;  Stop 17 at 19:29


Albuterol/ Ipratropium (Duoneb) 3 ml RTQID NEB  Last administered on  09

:15;  Start 17 at 20:00;  Stop 17 at 19:59


Alprazolam (Xanax) 0.5 mg PRN Q8HRS  PRN PO ANXIETY / AGITATION Last 

administered on  10:23;  Start 17 at 00:30


Aripiprazole (Abilify) 2 mg DAILY PO  Last administered on  09:04;  

Start 17 at 09:00


Aspirin (Children'S Aspirin) 81 mg DAILY PO  Last administered on  09:05

;  Start 17 at 09:00


Digoxin (Lanoxin) 250 mcg DAILY PO  Last administered on  09:06;  Start 

17 at 09:00


Diltiazem HCl (Cardizem 24hr Cd) 180 mg BID PO  Last administered on  09

:05;  Start 17 at 09:00


Furosemide (Lasix) 40 mg DAILY PO  Last administered on  09:06;  Start  at 09:00


Lisinopril (Prinivil) 40 mg DAILY PO  Last administered on  09:07;  

Start 17 at 09:00


Metformin HCl (Glucophage) 500 mg TID PO  Last administered on  09:05;  

Start 17 at 09:00


Sertraline HCl (Zoloft) 50 mg DAILY PO  Last administered on  09:07;  

Start 17 at 09:00


Insulin Detemir (Levemir) 60 units QHS SQ ;  Start 17 at 21:00


Pantoprazole Sodium (Protonix) 40 mg DAILYAC PO  Last administered on  

09:03;  Start 17 at 07:30


Atorvastatin Calcium (Lipitor) 5 mg QHS PO ;  Start 17 at 21:00


Tamsulosin HCl (Flomax) 0.4 mg QHS PO ;  Start 17 at 21:00


Amlodipine Besylate (Norvasc) 5 mg DAILY PO  Last administered on t 10:23

;  Start 17 at 10:00





Active Scripts


Active


Abilify (Aripiprazole) 2 Mg Tablet 2 Mg PO DAILY


Reported


Amlodipine Besylate 5 Mg Tablet 5 Mg PO DAILY


Lantus Solostar (Insulin Glargine,Hum.rec.anlog) 100 Unit/1 Ml Insuln.pen 60 

Unit SQ QHS


Pravastatin Sodium 20 Mg Tablet 1 Tab PO DAILY


Omeprazole 20 Mg Capsule.dr 1 Cap PO BID


Zoloft (Sertraline Hcl) 50 Mg Tablet 1 Tab PO DAILY


Furosemide 40 Mg Tablet 1 Tab PO DAILY


Cardizem Cd (Diltiazem Hcl) 180 Mg Cap.er.24h 180 Mg PO BID


Digoxin 250 Mcg Tablet 250 Mcg PO DAILY


Proair Hfa Inhaler (Albuterol Sulfate) 8.5 Gm Hfa.aer.ad 8.5 Gm IH PRN TID PRN


Symbicort 160-4.5 Mcg Inhaler (Budesonide/Formoterol Fumarate) 10.2 Gm 

Hfa.aer.ad 10.2 Gm IH BID


NITROGLYCERIN SubLingual (Nitroglycerin) 0.4 Mg Tab.subl 0.4 Mg SL PRN Q5MIN PRN


Alprazolam 0.5 Mg Tab.rapdis 0.5 Mg PO PRN TID PRN


Indigo Chewable (Aspirin) 81 Mg Tab.chew 81 Mg PO DAILY


Metformin Hcl 500 Mg Tablet 500 Mg PO TID


Rapaflo (Silodosin) 8 Mg Capsule 8 Mg PO DAILY


Lisinopril 40 Mg Tablet 40 Mg PO DAILY





Allergies


Allergies:  


Coded Allergies:  


     acetaminophen (Unverified  Allergy, Intermediate, 1/5/15)


 DIZZINESS


     hydrocodone (Unverified  Allergy, Intermediate, 1/5/15)


 DIZZINESS


     latex (Unverified  Allergy, Intermediate, itch, 1/5/15)


     oxycodone (Unverified  Allergy, Intermediate, 1/5/15)


 DIZZINESS





ROS


Respiratory:  YES: Cough, SOB with excertion, Sputum Changes


Musculoskeletal:  Yes Muscular Weakness





Physical Exam


Physical Exam


Wheezing b/l on auscultation of lungs, +edema of lower extremities.


Heart:  Regular rate, Normal S1, Normal S2, No murmurs





Vitals


VITALS





 Vital Signs








  Date Time  Temp Pulse Resp B/P Pulse Ox O2 Delivery O2 Flow Rate FiO2


 


17 10:23  81  150/97    


 


17 08:26     96 Nasal Cannula 3.0 


 


17 07:00 97.5  18     





 97.5       











Labs


Labs





Laboratory Tests








Test


  17


18:30 17


19:41 17


22:54 17


01:32


 


White Blood Count


  9.4x10^3/uL


(4.0-11.0) 


  


  7.0x10^3/uL


(4.0-11.0)


 


Red Blood Count


  4.96x10^6/uL


(4.30-5.70) 


  


  5.12x10^6/uL


(4.30-5.70)


 


Hemoglobin


  13.9g/dL


(13.0-17.5) 


  


  14.3g/dL


(13.0-17.5)


 


Hematocrit


  41.0%


(39.0-53.0) 


  


  42.6%


(39.0-53.0)


 


Mean Corpuscular Volume 83fL ()    83fL () 


 


Mean Corpuscular Hemoglobin 28pg (25-35)    28pg (25-35) 


 


Mean Corpuscular Hemoglobin


Concent 34g/dL (31-37) 


  


  


  34g/dL (31-37) 


 


 


Red Cell Distribution Width


  17.4%


(11.5-14.5) 


  


  17.8%


(11.5-14.5)


 


Platelet Count


  196x10^3/uL


(140-400) 


  


  194x10^3/uL


(140-400)


 


Neutrophils (%) (Auto) 74% (31-73)    92% (31-73) 


 


Lymphocytes (%) (Auto) 9% (24-48)    5% (24-48) 


 


Monocytes (%) (Auto) 11% (0-9)    2% (0-9) 


 


Eosinophils (%) (Auto) 5% (0-3)    0% (0-3) 


 


Basophils (%) (Auto) 1% (0-3)    1% (0-3) 


 


Neutrophils # (Auto)


  7.0x10^3uL


(1.8-7.7) 


  


  6.4x10^3uL


(1.8-7.7)


 


Lymphocytes # (Auto)


  0.8x10^3/uL


(1.0-4.8) 


  


  0.3x10^3/uL


(1.0-4.8)


 


Monocytes # (Auto)


  1.0x10^3/uL


(0.0-1.1) 


  


  0.1x10^3/uL


(0.0-1.1)


 


Eosinophils # (Auto)


  0.4x10^3/uL


(0.0-0.7) 


  


  0.0x10^3/uL


(0.0-0.7)


 


Basophils # (Auto)


  0.1x10^3/uL


(0.0-0.2) 


  


  0.1x10^3/uL


(0.0-0.2)


 


Sodium Level


  135mmol/L


(136-145) 


  


  136mmol/L


(136-145)


 


Potassium Level


  4.0mmol/L


(3.5-5.1) 


  


  4.3mmol/L


(3.5-5.1)


 


Chloride Level


  97mmol/L


() 


  


  97mmol/L


()


 


Carbon Dioxide Level


  26mmol/L


(21-32) 


  


  28mmol/L


(21-32)


 


Anion Gap 12 (6-14)    11 (6-14) 


 


Blood Urea Nitrogen 20mg/dL (8-26)    21mg/dL (8-26) 


 


Creatinine


  1.3mg/dL


(0.7-1.3) 


  


  1.3mg/dL


(0.7-1.3)


 


Estimated GFR


(Cockcroft-Gault) 54.9 


  


  


  54.9 


 


 


Glucose Level


  162mg/dL


(70-99) 


  


  287mg/dL


(70-99)


 


Calcium Level


  8.7mg/dL


(8.5-10.1) 


  


  8.9mg/dL


(8.5-10.1)


 


Total Bilirubin


  0.8mg/dL


(0.2-1.0) 


  


  


 


 


Direct Bilirubin


  0.2mg/dL


(0.0-0.2) 


  


  


 


 


Aspartate Amino Transf


(AST/SGOT) 26U/L (15-37) 


  


  


  


 


 


Alanine Aminotransferase


(ALT/SGPT) 23U/L (16-63) 


  


  


  


 


 


Alkaline Phosphatase 76U/L ()    


 


Troponin I Quantitative


  < 0.017ng/mL


(0.000-0.055) 


  


  < 0.017ng/mL


(0.000-0.055)


 


NT-Pro-B-Type Natriuretic


Peptide 854pg/mL


(0-124) 


  


  


 


 


Total Protein


  7.4g/dL


(6.4-8.2) 


  


  


 


 


Albumin


  3.8g/dL


(3.4-5.0) 


  


  


 


 


Lipase 66U/L ()    


 


Urine Color  Yellow   


 


Urine Clarity  Clear   


 


Urine pH  6.0   


 


Urine Specific Gravity  <=1.005   


 


Urine Protein


  


  Negativemg/dL


(NEG-TRACE) 


  


 


 


Urine Glucose (UA)


  


  Negativemg/dL


(NEG) 


  


 


 


Urine Ketones (Stick)


  


  Negativemg/dL


(NEG) 


  


 


 


Urine Blood  Negative (NEG)   


 


Urine Nitrite  Negative (NEG)   


 


Urine Bilirubin  Negative (NEG)   


 


Urine Urobilinogen Dipstick


  


  1.0mg/dL (0.2


mg/dL) 


  


 


 


Urine Leukocyte Esterase  Negative (NEG)   


 


Urine RBC  0/HPF (0-2)   


 


Urine WBC  Occ/HPF (0-4)   


 


Urine Squamous Epithelial


Cells 


  Occ/LPF 


  


  


 


 


Urine Bacteria  0/HPF (0-FEW)   


 


Glucose (Fingerstick)


  


  


  138mg/dL


(70-99) 


 


 


Segmented Neutrophils %    80% (35-66) 


 


Band Neutrophils %    4% (0-9) 


 


Lymphocytes %    10% (24-48) 


 


Monocytes %    5% (0-10) 


 


Basophils %    1% (0-3) 


 


Platelet Estimate


  


  


  


  Adequate


(ADEQUATE)


 


Anisocytosis    Slight 














Test


  17


07:28 17


07:54 17


09:01 


 


 


Troponin I Quantitative


  < 0.017ng/mL


(0.000-0.055) 


  


  


 


 


Glucose (Fingerstick)


  


  335mg/dL


(70-99) 


  


 


 


Influenza Type A Antigen


  


  


  Negative


(NEGATIVE) 


 


 


Influenza Type B Antigen


  


  


  Negative


(NEGATIVE) 


 








Laboratory Tests








Test


  17


18:30 17


19:41 17


22:54 17


01:32


 


White Blood Count


  9.4x10^3/uL


(4.0-11.0) 


  


  7.0x10^3/uL


(4.0-11.0)


 


Red Blood Count


  4.96x10^6/uL


(4.30-5.70) 


  


  5.12x10^6/uL


(4.30-5.70)


 


Hemoglobin


  13.9g/dL


(13.0-17.5) 


  


  14.3g/dL


(13.0-17.5)


 


Hematocrit


  41.0%


(39.0-53.0) 


  


  42.6%


(39.0-53.0)


 


Mean Corpuscular Volume 83fL ()    83fL () 


 


Mean Corpuscular Hemoglobin 28pg (25-35)    28pg (25-35) 


 


Mean Corpuscular Hemoglobin


Concent 34g/dL (31-37) 


  


  


  34g/dL (31-37) 


 


 


Red Cell Distribution Width


  17.4%


(11.5-14.5) 


  


  17.8%


(11.5-14.5)


 


Platelet Count


  196x10^3/uL


(140-400) 


  


  194x10^3/uL


(140-400)


 


Neutrophils (%) (Auto) 74% (31-73)    92% (31-73) 


 


Lymphocytes (%) (Auto) 9% (24-48)    5% (24-48) 


 


Monocytes (%) (Auto) 11% (0-9)    2% (0-9) 


 


Eosinophils (%) (Auto) 5% (0-3)    0% (0-3) 


 


Basophils (%) (Auto) 1% (0-3)    1% (0-3) 


 


Neutrophils # (Auto)


  7.0x10^3uL


(1.8-7.7) 


  


  6.4x10^3uL


(1.8-7.7)


 


Lymphocytes # (Auto)


  0.8x10^3/uL


(1.0-4.8) 


  


  0.3x10^3/uL


(1.0-4.8)


 


Monocytes # (Auto)


  1.0x10^3/uL


(0.0-1.1) 


  


  0.1x10^3/uL


(0.0-1.1)


 


Eosinophils # (Auto)


  0.4x10^3/uL


(0.0-0.7) 


  


  0.0x10^3/uL


(0.0-0.7)


 


Basophils # (Auto)


  0.1x10^3/uL


(0.0-0.2) 


  


  0.1x10^3/uL


(0.0-0.2)


 


Sodium Level


  135mmol/L


(136-145) 


  


  136mmol/L


(136-145)


 


Potassium Level


  4.0mmol/L


(3.5-5.1) 


  


  4.3mmol/L


(3.5-5.1)


 


Chloride Level


  97mmol/L


() 


  


  97mmol/L


()


 


Carbon Dioxide Level


  26mmol/L


(21-32) 


  


  28mmol/L


(21-32)


 


Anion Gap 12 (6-14)    11 (6-14) 


 


Blood Urea Nitrogen 20mg/dL (8-26)    21mg/dL (8-26) 


 


Creatinine


  1.3mg/dL


(0.7-1.3) 


  


  1.3mg/dL


(0.7-1.3)


 


Estimated GFR


(Cockcroft-Gault) 54.9 


  


  


  54.9 


 


 


Glucose Level


  162mg/dL


(70-99) 


  


  287mg/dL


(70-99)


 


Calcium Level


  8.7mg/dL


(8.5-10.1) 


  


  8.9mg/dL


(8.5-10.1)


 


Total Bilirubin


  0.8mg/dL


(0.2-1.0) 


  


  


 


 


Direct Bilirubin


  0.2mg/dL


(0.0-0.2) 


  


  


 


 


Aspartate Amino Transf


(AST/SGOT) 26U/L (15-37) 


  


  


  


 


 


Alanine Aminotransferase


(ALT/SGPT) 23U/L (16-63) 


  


  


  


 


 


Alkaline Phosphatase 76U/L ()    


 


Troponin I Quantitative


  < 0.017ng/mL


(0.000-0.055) 


  


  < 0.017ng/mL


(0.000-0.055)


 


NT-Pro-B-Type Natriuretic


Peptide 854pg/mL


(0-124) 


  


  


 


 


Total Protein


  7.4g/dL


(6.4-8.2) 


  


  


 


 


Albumin


  3.8g/dL


(3.4-5.0) 


  


  


 


 


Lipase 66U/L ()    


 


Urine Color  Yellow   


 


Urine Clarity  Clear   


 


Urine pH  6.0   


 


Urine Specific Gravity  <=1.005   


 


Urine Protein


  


  Negativemg/dL


(NEG-TRACE) 


  


 


 


Urine Glucose (UA)


  


  Negativemg/dL


(NEG) 


  


 


 


Urine Ketones (Stick)


  


  Negativemg/dL


(NEG) 


  


 


 


Urine Blood  Negative (NEG)   


 


Urine Nitrite  Negative (NEG)   


 


Urine Bilirubin  Negative (NEG)   


 


Urine Urobilinogen Dipstick


  


  1.0mg/dL (0.2


mg/dL) 


  


 


 


Urine Leukocyte Esterase  Negative (NEG)   


 


Urine RBC  0/HPF (0-2)   


 


Urine WBC  Occ/HPF (0-4)   


 


Urine Squamous Epithelial


Cells 


  Occ/LPF 


  


  


 


 


Urine Bacteria  0/HPF (0-FEW)   


 


Glucose (Fingerstick)


  


  


  138mg/dL


(70-99) 


 


 


Segmented Neutrophils %    80% (35-66) 


 


Band Neutrophils %    4% (0-9) 


 


Lymphocytes %    10% (24-48) 


 


Monocytes %    5% (0-10) 


 


Basophils %    1% (0-3) 


 


Platelet Estimate


  


  


  


  Adequate


(ADEQUATE)


 


Anisocytosis    Slight 














Test


  17


07:28 17


07:54 17


09:01 


 


 


Troponin I Quantitative


  < 0.017ng/mL


(0.000-0.055) 


  


  


 


 


Glucose (Fingerstick)


  


  335mg/dL


(70-99) 


  


 


 


Influenza Type A Antigen


  


  


  Negative


(NEGATIVE) 


 


 


Influenza Type B Antigen


  


  


  Negative


(NEGATIVE) 


 











Assessment/Plan


Assessment/Plan


1. CHF- Continue current diuretics.


2. COPD Exacerbation- Will defer tx to recommendations of primary team.





Thank your for the opportunity to participate in the care of this patient. Will 

continue to follow.








MARIAM LEONARD MD 2017 10:39

## 2017-02-07 NOTE — HP
ADMIT DATE:  02/07/2017



CHIEF COMPLAINT AND HISTORY OF PRESENT ILLNESS:  This is a 66-year-old male, who

is well known to me from previous hospitalizations.  The patient presented to

the Emergency Room on the day of admission with a 3-day history of increased

shortness of breath and a productive cough.  The patient does have a history of

COPD and stated that his shortness of breath with exertion had significantly

increased over the last 3 days.  Upon examination in the Emergency Room, a chest

x-ray was done, which showed no acute obvious infiltrate or pneumothorax present

and EKG showed normal sinus rhythm with a regular rate.  Physical exam by the

Emergency Room did reveal shortness of breath with expiratory wheezing on exam. 

The patient was given DuoNeb  treatment, as well as prednisone 50 mg p.o.  The

patient remained hypoxic and was subsequently admitted to the hospital for

further evaluation and workup.



PAST MEDICAL HISTORY:  Known cardiomyopathy followed by Dr. Carlton as an

outpatient, AFib, diabetes, which is insulin dependent, anxiety and depression,

COPD, dyslipidemia, hypertension, and GERD.



PAST SURGICAL HISTORY:  Coronary bypass surgery, hernia repair, rotator cuff

repair, and a bladder surgery.



SOCIAL HISTORY:  The patient is a nonsmoker, nondrinker.  The patient lives at

home with his family.  He is .  He is retired from disability.



FAMILY HISTORY:  Unremarkable.



REVIEW OF SYSTEMS:  As mentioned above.



MEDICATIONS:  Brought with the patient, listed on the computer, and have been

addressed.



ALLERGIES:  He is allergic to ACETAMINOPHEN, HYDROCODONE, LATEX, and OXYCODONE.



PHYSICAL EXAMINATION:

GENERAL:  He is a well-developed and well-nourished male, who is in no apparent

distress on the morning of my examination.

VITAL SIGNS:  Stable.  He is afebrile.  He is currently on 3 liters of O2 per

nasal cannula.

HEENT:  Head, eyes, ears, nose, and throat are unremarkable.

NECK:  Supple, without adenopathy or thyromegaly.

CHEST:  Clear to auscultation.  The previously noted expiratory wheeze has

resolved.  Lung sounds are slightly diminished throughout.

HEART:  Regular rate and rhythm without S3, S4, or murmur.

ABDOMEN:  Soft, nontender, without hepatosplenomegaly or mass.

EXTREMITIES:  Without cyanosis, clubbing, or edema.

NEUROLOGIC:  Grossly intact.



IMPRESSION:

1.  Chronic obstructive pulmonary disease exacerbation with shortness of breath

and hypoxia.

2.  Cardiomyopathy.



PLAN:  The patient has been admitted.  Pulmonary and Cardiology have both been

consulted.  We will follow their lead in the patient's care and continue to

monitor, manage, and treat the patient appropriately during his hospitalization.

 



______________________________

YVETTE JENNINGS MD



DR:  LESLYE/tejinder  JOB#:  [id number] / [id number]

DD:  02/07/2017 10:56  DT:  02/07/2017 21:01

## 2017-02-07 NOTE — ACF
Admission Forms Criteria


Admission Criteria Met?:  SANDEEP Gomez Feb 7, 2017 00:20


ABDOUL HAMLIN MD Feb 7, 2017 18:11

## 2017-02-07 NOTE — RAD
Indication chest pain and shortness of breath.



AP views of the chest were obtained and are compared to an examination

5/25/2016.



Heart size is at the upper limits of normal but unchanged. There is no

congestive heart failure focal infiltrate significant pleural fluid collection

or pneumothorax. Postoperative changes are noted.



IMPRESSION: No acute finding apparent in the chest

## 2017-02-08 VITALS — DIASTOLIC BLOOD PRESSURE: 81 MMHG | SYSTOLIC BLOOD PRESSURE: 144 MMHG

## 2017-02-08 VITALS — DIASTOLIC BLOOD PRESSURE: 88 MMHG | SYSTOLIC BLOOD PRESSURE: 131 MMHG

## 2017-02-08 VITALS — DIASTOLIC BLOOD PRESSURE: 70 MMHG | SYSTOLIC BLOOD PRESSURE: 145 MMHG

## 2017-02-08 VITALS — DIASTOLIC BLOOD PRESSURE: 76 MMHG | SYSTOLIC BLOOD PRESSURE: 170 MMHG

## 2017-02-08 VITALS — DIASTOLIC BLOOD PRESSURE: 80 MMHG | SYSTOLIC BLOOD PRESSURE: 147 MMHG

## 2017-02-08 VITALS — DIASTOLIC BLOOD PRESSURE: 70 MMHG | SYSTOLIC BLOOD PRESSURE: 134 MMHG

## 2017-02-08 RX ADMIN — INSULIN ASPART SCH UNITS: 100 INJECTION, SOLUTION INTRAVENOUS; SUBCUTANEOUS at 17:42

## 2017-02-08 RX ADMIN — TAMSULOSIN HYDROCHLORIDE SCH MG: 0.4 CAPSULE ORAL at 20:45

## 2017-02-08 RX ADMIN — METFORMIN HYDROCHLORIDE SCH MG: 500 TABLET, FILM COATED ORAL at 15:08

## 2017-02-08 RX ADMIN — INSULIN ASPART SCH UNITS: 100 INJECTION, SOLUTION INTRAVENOUS; SUBCUTANEOUS at 17:41

## 2017-02-08 RX ADMIN — ALPRAZOLAM PRN MG: 0.5 TABLET ORAL at 20:45

## 2017-02-08 RX ADMIN — ALPRAZOLAM PRN MG: 0.5 TABLET ORAL at 10:14

## 2017-02-08 RX ADMIN — DILTIAZEM HYDROCHLORIDE SCH MG: 180 CAPSULE, EXTENDED RELEASE ORAL at 20:44

## 2017-02-08 RX ADMIN — IPRATROPIUM BROMIDE AND ALBUTEROL SULFATE SCH ML: .5; 3 SOLUTION RESPIRATORY (INHALATION) at 10:39

## 2017-02-08 RX ADMIN — DILTIAZEM HYDROCHLORIDE SCH MG: 180 CAPSULE, EXTENDED RELEASE ORAL at 10:10

## 2017-02-08 RX ADMIN — PREDNISONE SCH MG: 20 TABLET ORAL at 10:14

## 2017-02-08 RX ADMIN — LISINOPRIL SCH MG: 40 TABLET ORAL at 10:12

## 2017-02-08 RX ADMIN — ASPIRIN 81 MG CHEWABLE TABLET SCH MG: 81 TABLET CHEWABLE at 10:10

## 2017-02-08 RX ADMIN — INSULIN DETEMIR SCH UNITS: 100 INJECTION, SOLUTION SUBCUTANEOUS at 20:52

## 2017-02-08 RX ADMIN — LEVOFLOXACIN SCH MG: 500 TABLET, FILM COATED ORAL at 10:14

## 2017-02-08 RX ADMIN — INSULIN ASPART SCH UNITS: 100 INJECTION, SOLUTION INTRAVENOUS; SUBCUTANEOUS at 12:37

## 2017-02-08 RX ADMIN — AMLODIPINE BESYLATE SCH MG: 5 TABLET ORAL at 10:12

## 2017-02-08 RX ADMIN — INSULIN ASPART SCH UNITS: 100 INJECTION, SOLUTION INTRAVENOUS; SUBCUTANEOUS at 10:08

## 2017-02-08 RX ADMIN — PANTOPRAZOLE SODIUM SCH MG: 40 TABLET, DELAYED RELEASE ORAL at 10:08

## 2017-02-08 RX ADMIN — IPRATROPIUM BROMIDE AND ALBUTEROL SULFATE SCH ML: .5; 3 SOLUTION RESPIRATORY (INHALATION) at 07:10

## 2017-02-08 RX ADMIN — IPRATROPIUM BROMIDE AND ALBUTEROL SULFATE SCH ML: .5; 3 SOLUTION RESPIRATORY (INHALATION) at 14:53

## 2017-02-08 RX ADMIN — METFORMIN HYDROCHLORIDE SCH MG: 500 TABLET, FILM COATED ORAL at 10:10

## 2017-02-08 RX ADMIN — SERTRALINE HYDROCHLORIDE SCH MG: 50 TABLET ORAL at 10:12

## 2017-02-08 RX ADMIN — METFORMIN HYDROCHLORIDE SCH MG: 500 TABLET, FILM COATED ORAL at 20:45

## 2017-02-08 RX ADMIN — Medication SCH MG: at 10:11

## 2017-02-08 RX ADMIN — INSULIN ASPART SCH UNITS: 100 INJECTION, SOLUTION INTRAVENOUS; SUBCUTANEOUS at 12:36

## 2017-02-08 RX ADMIN — ARIPIPRAZOLE SCH MG: 2 TABLET ORAL at 10:09

## 2017-02-08 RX ADMIN — ATORVASTATIN CALCIUM SCH MG: 10 TABLET, FILM COATED ORAL at 20:44

## 2017-02-08 RX ADMIN — IPRATROPIUM BROMIDE AND ALBUTEROL SULFATE SCH ML: .5; 3 SOLUTION RESPIRATORY (INHALATION) at 19:33

## 2017-02-08 NOTE — PDOC
SUBJECTIVE


Subjective


c/o SOB , wheezing and cough brwonish sputum,





OBJECTIVE


Objective


no fever, requiring O2 BNC


Vital Signs





Vital Signs








  Date Time  Temp Pulse Resp B/P Pulse Ox O2 Delivery O2 Flow Rate FiO2


 


2/8/17 07:11     97 Nasal Cannula 3.0 


 


2/8/17 03:00 96.4 80 18 134/70 96 Nasal Cannula 5.0 





 96.4       


 


2/8/17 00:40      Nasal Cannula 3.0 


 


2/7/17 23:00 97.5 75 18 142/73 96 Nasal Cannula 5.0 





 97.5       


 


2/7/17 21:13  78  159/78    


 


2/7/17 20:00      Nasal Cannula 3.0 


 


2/7/17 19:56     96 Nasal Cannula 3.0 


 


2/7/17 19:00 97.7 83 18 158/74 96 Nasal Cannula 5.0 





 97.7       


 


2/7/17 16:46      Nasal Cannula 3.0 


 


2/7/17 15:00 97.4 88 18 162/74 94 Nasal Cannula 5.0 





 97.4       


 


2/7/17 12:08     97 Nasal Cannula 3.0 


 


2/7/17 11:00 97.5 86 18 146/67 95 Nasal Cannula 5.0 





 97.5       


 


2/7/17 10:23  81  150/97    








I & O











 Intake and Output 


 


 2/8/17





 07:00


 


Intake Total 720 ml


 


Output Total 1250 ml


 


Balance -530 ml


 


 


 


Intake Oral 720 ml


 


Output Urine Total 1250 ml


 


# Voids 5











PHYSICAL EXAM


Physical Exam


lungs few scattered wheezes and ronchi


heart RRR


abd soft


ext no edema





ASSESSMENT/PLAN


Assessment/Plan


1. COPD exacerbation


   -Pulmonary consulted


   -CXR: No acute finding apparent in the chest


   -WBC 7.0


   -wheezing and cough brown sputum start levaquin and prednisone





2. Cardiomyapathy, follows Dr Carlton as outpt


   -Cardiology consulted


   -Home meds restarted


   


3. DM


   -BS better , add premeal insulin , continue ss with steroid


Problems:  





COMMENT


Lab





Laboratory Tests








Test


  2/7/17


11:13 2/7/17


16:49 2/7/17


21:07 2/8/17


08:19


 


Glucose (Fingerstick)


  331mg/dL


(70-99) 148mg/dL


(70-99) 123mg/dL


(70-99) 150mg/dL


(70-99)














SIMONA SAMANIEGO MD Feb 8, 2017 09:31

## 2017-02-08 NOTE — PDOC
PROGRESS NOTES


Subjective


Subjective


Mr. Pan reports that he is tired today due to trouble sleeping last night. He 

is still struggling with shortness of breath, though he says this is mildly 

improved at this time. He denies chest pain or other cardiac complaints.





Objective


Objective





 Vital Signs








  Date Time  Temp Pulse Resp B/P Pulse Ox O2 Delivery O2 Flow Rate FiO2


 


2/8/17 07:11     97 Nasal Cannula 3.0 


 


2/8/17 03:00 96.4 80 18 134/70    





 96.4       














 Intake and Output 


 


 2/8/17





 07:00


 


Intake Total 720 ml


 


Output Total 1250 ml


 


Balance -530 ml


 


 


 


Intake Oral 720 ml


 


Output Urine Total 1250 ml


 


# Voids 5











Physical Exam


Physical Exam


Wheezing b/l on auscultation of lungs, but this improving. +edema of lower 

extremities b/l.


Heart:  Regular rate, Normal S1, Normal S2, No murmurs





Assessment


Assessment


 Problems


Medical Problems:


(1) COPD exacerbation


Status: Acute  





(2) SOB (shortness of breath)


Status: Acute  








Plan


Plan of Care


1. CHF- Continue use of current diuretics. Order CXR in AM.


2. COPD Exacerbation- Agree with current Abx, breathing treatments, and use of 

steroids.





Will continue to follow.





Comment


Review of Relevant


I have reviewed the following items angi (where applicable) has been applied.


Labs





Laboratory Tests








Test


  2/6/17


18:30 2/6/17


19:41 2/6/17


22:54 2/7/17


01:32


 


White Blood Count


  9.4x10^3/uL


(4.0-11.0) 


  


  7.0x10^3/uL


(4.0-11.0)


 


Red Blood Count


  4.96x10^6/uL


(4.30-5.70) 


  


  5.12x10^6/uL


(4.30-5.70)


 


Hemoglobin


  13.9g/dL


(13.0-17.5) 


  


  14.3g/dL


(13.0-17.5)


 


Hematocrit


  41.0%


(39.0-53.0) 


  


  42.6%


(39.0-53.0)


 


Mean Corpuscular Volume 83fL ()    83fL () 


 


Mean Corpuscular Hemoglobin 28pg (25-35)    28pg (25-35) 


 


Mean Corpuscular Hemoglobin


Concent 34g/dL (31-37) 


  


  


  34g/dL (31-37) 


 


 


Red Cell Distribution Width


  17.4%


(11.5-14.5) 


  


  17.8%


(11.5-14.5)


 


Platelet Count


  196x10^3/uL


(140-400) 


  


  194x10^3/uL


(140-400)


 


Neutrophils (%) (Auto) 74% (31-73)    92% (31-73) 


 


Lymphocytes (%) (Auto) 9% (24-48)    5% (24-48) 


 


Monocytes (%) (Auto) 11% (0-9)    2% (0-9) 


 


Eosinophils (%) (Auto) 5% (0-3)    0% (0-3) 


 


Basophils (%) (Auto) 1% (0-3)    1% (0-3) 


 


Neutrophils # (Auto)


  7.0x10^3uL


(1.8-7.7) 


  


  6.4x10^3uL


(1.8-7.7)


 


Lymphocytes # (Auto)


  0.8x10^3/uL


(1.0-4.8) 


  


  0.3x10^3/uL


(1.0-4.8)


 


Monocytes # (Auto)


  1.0x10^3/uL


(0.0-1.1) 


  


  0.1x10^3/uL


(0.0-1.1)


 


Eosinophils # (Auto)


  0.4x10^3/uL


(0.0-0.7) 


  


  0.0x10^3/uL


(0.0-0.7)


 


Basophils # (Auto)


  0.1x10^3/uL


(0.0-0.2) 


  


  0.1x10^3/uL


(0.0-0.2)


 


Sodium Level


  135mmol/L


(136-145) 


  


  136mmol/L


(136-145)


 


Potassium Level


  4.0mmol/L


(3.5-5.1) 


  


  4.3mmol/L


(3.5-5.1)


 


Chloride Level


  97mmol/L


() 


  


  97mmol/L


()


 


Carbon Dioxide Level


  26mmol/L


(21-32) 


  


  28mmol/L


(21-32)


 


Anion Gap 12 (6-14)    11 (6-14) 


 


Blood Urea Nitrogen 20mg/dL (8-26)    21mg/dL (8-26) 


 


Creatinine


  1.3mg/dL


(0.7-1.3) 


  


  1.3mg/dL


(0.7-1.3)


 


Estimated GFR


(Cockcroft-Gault) 54.9 


  


  


  54.9 


 


 


Glucose Level


  162mg/dL


(70-99) 


  


  287mg/dL


(70-99)


 


Calcium Level


  8.7mg/dL


(8.5-10.1) 


  


  8.9mg/dL


(8.5-10.1)


 


Total Bilirubin


  0.8mg/dL


(0.2-1.0) 


  


  


 


 


Direct Bilirubin


  0.2mg/dL


(0.0-0.2) 


  


  


 


 


Aspartate Amino Transf


(AST/SGOT) 26U/L (15-37) 


  


  


  


 


 


Alanine Aminotransferase


(ALT/SGPT) 23U/L (16-63) 


  


  


  


 


 


Alkaline Phosphatase 76U/L ()    


 


Troponin I Quantitative


  < 0.017ng/mL


(0.000-0.055) 


  


  < 0.017ng/mL


(0.000-0.055)


 


NT-Pro-B-Type Natriuretic


Peptide 854pg/mL


(0-124) 


  


  


 


 


Total Protein


  7.4g/dL


(6.4-8.2) 


  


  


 


 


Albumin


  3.8g/dL


(3.4-5.0) 


  


  


 


 


Lipase 66U/L ()    


 


Urine Color  Yellow   


 


Urine Clarity  Clear   


 


Urine pH  6.0   


 


Urine Specific Gravity  <=1.005   


 


Urine Protein


  


  Negativemg/dL


(NEG-TRACE) 


  


 


 


Urine Glucose (UA)


  


  Negativemg/dL


(NEG) 


  


 


 


Urine Ketones (Stick)


  


  Negativemg/dL


(NEG) 


  


 


 


Urine Blood  Negative (NEG)   


 


Urine Nitrite  Negative (NEG)   


 


Urine Bilirubin  Negative (NEG)   


 


Urine Urobilinogen Dipstick


  


  1.0mg/dL (0.2


mg/dL) 


  


 


 


Urine Leukocyte Esterase  Negative (NEG)   


 


Urine RBC  0/HPF (0-2)   


 


Urine WBC  Occ/HPF (0-4)   


 


Urine Squamous Epithelial


Cells 


  Occ/LPF 


  


  


 


 


Urine Bacteria  0/HPF (0-FEW)   


 


Glucose (Fingerstick)


  


  


  138mg/dL


(70-99) 


 


 


Segmented Neutrophils %    80% (35-66) 


 


Band Neutrophils %    4% (0-9) 


 


Lymphocytes %    10% (24-48) 


 


Monocytes %    5% (0-10) 


 


Basophils %    1% (0-3) 


 


Platelet Estimate


  


  


  


  Adequate


(ADEQUATE)


 


Anisocytosis    Slight 














Test


  2/7/17


07:28 2/7/17


07:54 2/7/17


09:01 2/7/17


11:13


 


Troponin I Quantitative


  < 0.017ng/mL


(0.000-0.055) 


  


  


 


 


Glucose (Fingerstick)


  


  335mg/dL


(70-99) 


  331mg/dL


(70-99)


 


Influenza Type A Antigen


  


  


  Negative


(NEGATIVE) 


 


 


Influenza Type B Antigen


  


  


  Negative


(NEGATIVE) 


 














Test


  2/7/17


16:49 2/7/17


21:07 2/8/17


08:19 


 


 


Glucose (Fingerstick)


  148mg/dL


(70-99) 123mg/dL


(70-99) 150mg/dL


(70-99) 


 








Laboratory Tests








Test


  2/7/17


11:13 2/7/17


16:49 2/7/17


21:07 2/8/17


08:19


 


Glucose (Fingerstick)


  331mg/dL


(70-99) 148mg/dL


(70-99) 123mg/dL


(70-99) 150mg/dL


(70-99)








Medications





 Current Medications


Albuterol/ Ipratropium (Duoneb) 3 ml 1X  ONCE NEB  Last administered on 2/6/ 17at 19:47;  Start 2/6/17 at 19:30;  Stop 2/6/17 at 19:31;  Status DC


Prednisone (Prednisone) 50 mg 1X  ONCE PO  Last administered on 2/6/17at 19:45;

  Start 2/6/17 at 19:30;  Stop 2/6/17 at 19:31;  Status DC


Ondansetron HCl (Zofran) 4 mg PRN Q8HRS  PRN IV NAUSEA/VOMITING;  Start 2/6/17 

at 19:30;  Stop 2/7/17 at 19:29;  Status DC


Morphine Sulfate 2 mg PRN Q2HR  PRN IV PAIN;  Start 2/6/17 at 19:30;  Stop 2/7/ 17 at 19:29;  Status DC


Nitroglycerin (Nitrostat) 0.4 mg PRN Q5MIN  PRN SL CHEST PAIN;  Start 2/6/17 at 

19:30;  Stop 2/7/17 at 19:29;  Status DC


Albuterol/ Ipratropium (Duoneb) 3 ml RTQID NEB  Last administered on 2/7/17at 19

:55;  Start 2/6/17 at 20:00;  Stop 2/7/17 at 19:59;  Status DC


Alprazolam (Xanax) 0.5 mg PRN Q8HRS  PRN PO ANXIETY / AGITATION Last 

administered on 2/7/17at 21:13;  Start 2/7/17 at 00:30


Aripiprazole (Abilify) 2 mg DAILY PO  Last administered on 2/7/17at 09:04;  

Start 2/7/17 at 09:00


Aspirin (Children'S Aspirin) 81 mg DAILY PO  Last administered on 2/7/17at 09:05

;  Start 2/7/17 at 09:00


Digoxin (Lanoxin) 250 mcg DAILY PO  Last administered on 2/7/17at 09:06;  Start 

2/7/17 at 09:00


Diltiazem HCl (Cardizem 24hr Cd) 180 mg BID PO  Last administered on 2/7/17at 21

:13;  Start 2/7/17 at 09:00


Furosemide (Lasix) 40 mg DAILY PO  Last administered on 2/7/17at 09:06;  Start 2 /7/17 at 09:00


Lisinopril (Prinivil) 40 mg DAILY PO  Last administered on 2/7/17at 09:07;  

Start 2/7/17 at 09:00


Metformin HCl (Glucophage) 500 mg TID PO  Last administered on 2/7/17at 21:13;  

Start 2/7/17 at 09:00


Sertraline HCl (Zoloft) 50 mg DAILY PO  Last administered on 2/7/17at 09:07;  

Start 2/7/17 at 09:00


Insulin Detemir (Levemir) 60 units QHS SQ ;  Start 2/7/17 at 21:00


Pantoprazole Sodium (Protonix) 40 mg DAILYAC PO  Last administered on 2/7/17at 

09:03;  Start 2/7/17 at 07:30


Atorvastatin Calcium (Lipitor) 5 mg QHS PO  Last administered on 2/7/17at 21:13

;  Start 2/7/17 at 21:00


Tamsulosin HCl (Flomax) 0.4 mg QHS PO  Last administered on 2/7/17at 21:13;  

Start 2/7/17 at 21:00


Amlodipine Besylate (Norvasc) 5 mg DAILY PO  Last administered on 2/7/17at 10:23

;  Start 2/7/17 at 10:00


Insulin Aspart (Novolog) 0-7 UNITS TIDWMEALS SQ  Last administered on 2/7/17at 

13:42;  Start 2/7/17 at 13:00


Dextrose 12.5 gm PRN Q15MIN  PRN IV SEE COMMENTS;  Start 2/7/17 at 13:00


Albuterol/ Ipratropium (Duoneb) 3 ml RTQID NEB  Last administered on 2/8/17at 07

:10;  Start 2/8/17 at 08:00


Albuterol/ Ipratropium (Duoneb) 3 ml 1X  ONCE NEB  Last administered on 2/8/ 17at 00:40;  Start 2/8/17 at 01:00;  Stop 2/8/17 at 01:01;  Status DC





Active Scripts


Active


Abilify (Aripiprazole) 2 Mg Tablet 2 Mg PO DAILY


Reported


Amlodipine Besylate 5 Mg Tablet 5 Mg PO DAILY


Lantus Solostar (Insulin Glargine,Hum.rec.anlog) 100 Unit/1 Ml Insuln.pen 60 

Unit SQ QHS


Pravastatin Sodium 20 Mg Tablet 1 Tab PO DAILY


Omeprazole 20 Mg Capsule.dr 1 Cap PO BID


Zoloft (Sertraline Hcl) 50 Mg Tablet 1 Tab PO DAILY


Furosemide 40 Mg Tablet 1 Tab PO DAILY


Cardizem Cd (Diltiazem Hcl) 180 Mg Cap.er.24h 180 Mg PO BID


Digoxin 250 Mcg Tablet 250 Mcg PO DAILY


Proair Hfa Inhaler (Albuterol Sulfate) 8.5 Gm Hfa.aer.ad 8.5 Gm IH PRN TID PRN


Symbicort 160-4.5 Mcg Inhaler (Budesonide/Formoterol Fumarate) 10.2 Gm 

Hfa.aer.ad 10.2 Gm IH BID


NITROGLYCERIN SubLingual (Nitroglycerin) 0.4 Mg Tab.subl 0.4 Mg SL PRN Q5MIN PRN


Alprazolam 0.5 Mg Tab.rapdis 0.5 Mg PO PRN TID PRN


Inidgo Chewable (Aspirin) 81 Mg Tab.chew 81 Mg PO DAILY


Metformin Hcl 500 Mg Tablet 500 Mg PO TID


Rapaflo (Silodosin) 8 Mg Capsule 8 Mg PO DAILY


Lisinopril 40 Mg Tablet 40 Mg PO DAILY


Vitals/I & O





 Vital Sign - Last 24 Hours








 2/7/17 2/7/17 2/7/17 2/7/17





 10:23 11:00 12:08 15:00


 


Temp  97.5  97.4





  97.5  97.4


 


Pulse 81 86  88


 


Resp  18  18


 


B/P 150/97 146/67  162/74


 


Pulse Ox  95 97 94


 


O2 Delivery  Nasal Cannula Nasal Cannula Nasal Cannula


 


O2 Flow Rate  5.0 3.0 5.0


 


    





    





 2/7/17 2/7/17 2/7/17 2/7/17





 16:46 19:00 19:56 20:00


 


Temp  97.7  





  97.7  


 


Pulse  83  


 


Resp  18  


 


B/P  158/74  


 


Pulse Ox  96 96 


 


O2 Delivery Nasal Cannula Nasal Cannula Nasal Cannula Nasal Cannula


 


O2 Flow Rate 3.0 5.0 3.0 3.0


 


    





    





 2/7/17 2/7/17 2/8/17 2/8/17





 21:13 23:00 00:40 03:00


 


Temp  97.5  96.4





  97.5  96.4


 


Pulse 78 75  80


 


Resp  18  18


 


B/P 159/78 142/73  134/70


 


Pulse Ox  96  96


 


O2 Delivery  Nasal Cannula Nasal Cannula Nasal Cannula


 


O2 Flow Rate  5.0 3.0 5.0


 


    





    





 2/8/17   





 07:11   


 


Pulse Ox 97   


 


O2 Delivery Nasal Cannula   


 


O2 Flow Rate 3.0   














 Intake and Output   


 


 2/7/17 2/7/17 2/8/17





 15:00 23:00 07:00


 


Intake Total   720 ml


 


Output Total   1250 ml


 


Balance   -530 ml














MARIAM LEONARD MD Feb 8, 2017 09:32

## 2017-02-08 NOTE — RAD
Indication persistent cough. Pneumonia.



Noncontrast imaging through the chest was performed. Note is made of a

previous examination 2 years earlier.



Imaging through the upper abdomen is unremarkable. There is mediastinal

adenopathy similar to the previous exam. Coronary artery calcification is

noted. Opacity in the right upper lobe, likely reflecting scar, appears

stable. There are scattered calcified granulomas. No consolidated pneumonia is

seen. There is, however, a groundglass opacity measuring approximately 8 mm in

greatest dimension in the left upper lobe, image 26 series 2. A similar less

well defined groundglass opacity is seen in the left lower lobe image 46

measuring approximately 1.8 cm in greatest dimension Follow-up imaging,

assessing stability, should be considered.



IMPRESSION: Stable mediastinal adenopathy.

                          Stable parenchymal opacity, probably reflecting

scar, in the right upper lobe.

                           8 mm groundglass opacity in the left upper lobe.

Additional groundglass opacity in the left lower lobe somewhat less well

defined. Follow-up imaging should be considered.

                           Minimal volume loss at the lung bases likely

reflecting atelectasis







PQRS Compliance Statement:



One or more of the following individualized dose reduction techniques were

utilized for this examination:

1. Automated exposure control

2. Adjustment of the mA and/or kV according to patient size

3. Use of iterative reconstruction technique

## 2017-02-08 NOTE — PDOC
PULMONARY PROGRESS NOTES


Subjective


still has cough


could not sleep well last night


Vitals





 Vital Signs








  Date Time  Temp Pulse Resp B/P Pulse Ox O2 Delivery O2 Flow Rate FiO2


 


2/8/17 14:53      Nasal Cannula 3.0 


 


2/8/17 11:00 97.7 68 19 131/88 98   





 97.7       








General:  Alert, No acute distress


Lungs:  Other (decrease bs)


Cardiovascular:  S1


Abdomen:  Soft


Neuro Exam:  Alert


Extremities:  No Edema


Skin:  Warm


Labs





Laboratory Tests








Test


  2/6/17


18:30 2/6/17


19:41 2/6/17


22:54 2/7/17


01:32


 


White Blood Count


  9.4x10^3/uL


(4.0-11.0) 


  


  7.0x10^3/uL


(4.0-11.0)


 


Red Blood Count


  4.96x10^6/uL


(4.30-5.70) 


  


  5.12x10^6/uL


(4.30-5.70)


 


Hemoglobin


  13.9g/dL


(13.0-17.5) 


  


  14.3g/dL


(13.0-17.5)


 


Hematocrit


  41.0%


(39.0-53.0) 


  


  42.6%


(39.0-53.0)


 


Mean Corpuscular Volume 83fL ()    83fL () 


 


Mean Corpuscular Hemoglobin 28pg (25-35)    28pg (25-35) 


 


Mean Corpuscular Hemoglobin


Concent 34g/dL (31-37) 


  


  


  34g/dL (31-37) 


 


 


Red Cell Distribution Width


  17.4%


(11.5-14.5) 


  


  17.8%


(11.5-14.5)


 


Platelet Count


  196x10^3/uL


(140-400) 


  


  194x10^3/uL


(140-400)


 


Neutrophils (%) (Auto) 74% (31-73)    92% (31-73) 


 


Lymphocytes (%) (Auto) 9% (24-48)    5% (24-48) 


 


Monocytes (%) (Auto) 11% (0-9)    2% (0-9) 


 


Eosinophils (%) (Auto) 5% (0-3)    0% (0-3) 


 


Basophils (%) (Auto) 1% (0-3)    1% (0-3) 


 


Neutrophils # (Auto)


  7.0x10^3uL


(1.8-7.7) 


  


  6.4x10^3uL


(1.8-7.7)


 


Lymphocytes # (Auto)


  0.8x10^3/uL


(1.0-4.8) 


  


  0.3x10^3/uL


(1.0-4.8)


 


Monocytes # (Auto)


  1.0x10^3/uL


(0.0-1.1) 


  


  0.1x10^3/uL


(0.0-1.1)


 


Eosinophils # (Auto)


  0.4x10^3/uL


(0.0-0.7) 


  


  0.0x10^3/uL


(0.0-0.7)


 


Basophils # (Auto)


  0.1x10^3/uL


(0.0-0.2) 


  


  0.1x10^3/uL


(0.0-0.2)


 


Sodium Level


  135mmol/L


(136-145) 


  


  136mmol/L


(136-145)


 


Potassium Level


  4.0mmol/L


(3.5-5.1) 


  


  4.3mmol/L


(3.5-5.1)


 


Chloride Level


  97mmol/L


() 


  


  97mmol/L


()


 


Carbon Dioxide Level


  26mmol/L


(21-32) 


  


  28mmol/L


(21-32)


 


Anion Gap 12 (6-14)    11 (6-14) 


 


Blood Urea Nitrogen 20mg/dL (8-26)    21mg/dL (8-26) 


 


Creatinine


  1.3mg/dL


(0.7-1.3) 


  


  1.3mg/dL


(0.7-1.3)


 


Estimated GFR


(Cockcroft-Gault) 54.9 


  


  


  54.9 


 


 


Glucose Level


  162mg/dL


(70-99) 


  


  287mg/dL


(70-99)


 


Calcium Level


  8.7mg/dL


(8.5-10.1) 


  


  8.9mg/dL


(8.5-10.1)


 


Total Bilirubin


  0.8mg/dL


(0.2-1.0) 


  


  


 


 


Direct Bilirubin


  0.2mg/dL


(0.0-0.2) 


  


  


 


 


Aspartate Amino Transf


(AST/SGOT) 26U/L (15-37) 


  


  


  


 


 


Alanine Aminotransferase


(ALT/SGPT) 23U/L (16-63) 


  


  


  


 


 


Alkaline Phosphatase 76U/L ()    


 


Troponin I Quantitative


  < 0.017ng/mL


(0.000-0.055) 


  


  < 0.017ng/mL


(0.000-0.055)


 


NT-Pro-B-Type Natriuretic


Peptide 854pg/mL


(0-124) 


  


  


 


 


Total Protein


  7.4g/dL


(6.4-8.2) 


  


  


 


 


Albumin


  3.8g/dL


(3.4-5.0) 


  


  


 


 


Lipase 66U/L ()    


 


Urine Color  Yellow   


 


Urine Clarity  Clear   


 


Urine pH  6.0   


 


Urine Specific Gravity  <=1.005   


 


Urine Protein


  


  Negativemg/dL


(NEG-TRACE) 


  


 


 


Urine Glucose (UA)


  


  Negativemg/dL


(NEG) 


  


 


 


Urine Ketones (Stick)


  


  Negativemg/dL


(NEG) 


  


 


 


Urine Blood  Negative (NEG)   


 


Urine Nitrite  Negative (NEG)   


 


Urine Bilirubin  Negative (NEG)   


 


Urine Urobilinogen Dipstick


  


  1.0mg/dL (0.2


mg/dL) 


  


 


 


Urine Leukocyte Esterase  Negative (NEG)   


 


Urine RBC  0/HPF (0-2)   


 


Urine WBC  Occ/HPF (0-4)   


 


Urine Squamous Epithelial


Cells 


  Occ/LPF 


  


  


 


 


Urine Bacteria  0/HPF (0-FEW)   


 


Glucose (Fingerstick)


  


  


  138mg/dL


(70-99) 


 


 


Segmented Neutrophils %    80% (35-66) 


 


Band Neutrophils %    4% (0-9) 


 


Lymphocytes %    10% (24-48) 


 


Monocytes %    5% (0-10) 


 


Basophils %    1% (0-3) 


 


Platelet Estimate


  


  


  


  Adequate


(ADEQUATE)


 


Anisocytosis    Slight 














Test


  2/7/17


07:28 2/7/17


07:54 2/7/17


09:01 2/7/17


11:13


 


Troponin I Quantitative


  < 0.017ng/mL


(0.000-0.055) 


  


  


 


 


Glucose (Fingerstick)


  


  335mg/dL


(70-99) 


  331mg/dL


(70-99)


 


Influenza Type A Antigen


  


  


  Negative


(NEGATIVE) 


 


 


Influenza Type B Antigen


  


  


  Negative


(NEGATIVE) 


 














Test


  2/7/17


16:49 2/7/17


21:07 2/8/17


08:19 2/8/17


10:58


 


Glucose (Fingerstick)


  148mg/dL


(70-99) 123mg/dL


(70-99) 150mg/dL


(70-99) 254mg/dL


(70-99)








Laboratory Tests








Test


  2/7/17


16:49 2/7/17


21:07 2/8/17


08:19 2/8/17


10:58


 


Glucose (Fingerstick)


  148mg/dL


(70-99) 123mg/dL


(70-99) 150mg/dL


(70-99) 254mg/dL


(70-99)








Medications





Active Scripts








 Medications  Dose


 Route/Sig Days Date Category


 


 Amlodipine


 Besylate 5 Mg


 Tablet  5 Mg


 PO DAILY   2/7/17 Reported


 


 Lantus Solostar


  (Insulin


 Glargine,Hum.rec.anlog)


 100 Unit/1 Ml


 Insuln.pen  60 Unit


 SQ QHS   2/6/17 Reported


 


 Pravastatin


 Sodium 20 Mg


 Tablet  1 Tab


 PO DAILY   2/6/17 Reported


 


 Omeprazole 20 Mg


 Capsule.dr  1 Cap


 PO BID   2/6/17 Reported


 


 Zoloft


  (Sertraline Hcl)


 50 Mg Tablet  1 Tab


 PO DAILY   2/6/17 Reported


 


 Furosemide 40 Mg


 Tablet  1 Tab


 PO DAILY   2/6/17 Reported


 


 Abilify


  (Aripiprazole) 2


 Mg Tablet  2 Mg


 PO DAILY   6/16/14 Rx


 


 Cardizem Cd


  (Diltiazem Hcl)


 180 Mg Cap.er.24h  180 Mg


 PO BID   6/14/14 Reported


 


 Digoxin 250 Mcg


 Tablet  250 Mcg


 PO DAILY   6/14/14 Reported


 


 Proair Hfa


 Inhaler


  (Albuterol


 Sulfate) 8.5 Gm


 Hfa.aer.ad  8.5 Gm


 IH PRN TID PRN   5/13/14 Reported


 


 Symbicort 160-4.5


 Mcg Inhaler


  (Budesonide/Formoterol


 Fumarate) 10.2 Gm


 Hfa.aer.ad  10.2 Gm


 IH BID   5/13/14 Reported


 


 NITROGLYCERIN


 SubLingual


  (Nitroglycerin)


 0.4 Mg Tab.subl  0.4 Mg


 SL PRN Q5MIN PRN   5/13/14 Reported


 


 Alprazolam 0.5 Mg


 Tab.rapdis  0.5 Mg


 PO PRN TID PRN   5/13/14 Reported


 


 Indigo Chewable


  (Aspirin) 81 Mg


 Tab.chew  81 Mg


 PO DAILY   5/13/14 Reported


 


 Metformin Hcl 500


 Mg Tablet  500 Mg


 PO TID   5/13/14 Reported


 


 Rapaflo


  (Silodosin) 8 Mg


 Capsule  8 Mg


 PO DAILY   5/13/14 Reported


 


 Lisinopril 40 Mg


 Tablet  40 Mg


 PO DAILY   5/13/14 Reported











Impression


.


1.  Dyspnea with acute hypoxic respiratory failure secondary to acute


exacerbation of chronic obstructive pulmonary disease.


2.  Thirty-five years of tobacco use up to 3 packs per day.  Suspect underlying


chronic obstructive pulmonary disease.


3.  History of atrial fibrillation, was on anticoagulation, but developed


significant hemostasis, and as a result was taken off.  He had bronchoscopies


done in the past as well.


4. Neg influenza.





Plan


.





1.  Continue to slowly wean oxygen with keeping sats 92% to 94%.


2.  ct chest to r/o pneumonia


3.  Continue bronchodilators.


4.  We can hold on systemic steroids at present.  


5.  Weight loss is advised.


6.  d/w wife








ELEONORA ROUSE MD Feb 8, 2017 15:01

## 2017-02-09 VITALS — DIASTOLIC BLOOD PRESSURE: 64 MMHG | SYSTOLIC BLOOD PRESSURE: 103 MMHG

## 2017-02-09 VITALS — DIASTOLIC BLOOD PRESSURE: 72 MMHG | SYSTOLIC BLOOD PRESSURE: 155 MMHG

## 2017-02-09 LAB
ANION GAP SERPL CALC-SCNC: 10 MMOL/L (ref 6–14)
BUN SERPL-MCNC: 26 MG/DL (ref 8–26)
CALCIUM SERPL-MCNC: 9.4 MG/DL (ref 8.5–10.1)
CHLORIDE SERPL-SCNC: 104 MMOL/L (ref 98–107)
CO2 SERPL-SCNC: 28 MMOL/L (ref 21–32)
CREAT SERPL-MCNC: 1.1 MG/DL (ref 0.7–1.3)
ERYTHROCYTE [DISTWIDTH] IN BLOOD BY AUTOMATED COUNT: 18 % (ref 11.5–14.5)
GFR SERPLBLD BASED ON 1.73 SQ M-ARVRAT: 66.6 ML/MIN
GLUCOSE SERPL-MCNC: 162 MG/DL (ref 70–99)
HCT VFR BLD CALC: 42.8 % (ref 39–53)
HGB BLD-MCNC: 14.5 G/DL (ref 13–17.5)
MCH RBC QN AUTO: 28 PG (ref 25–35)
MCHC RBC AUTO-ENTMCNC: 34 G/DL (ref 31–37)
MCV RBC AUTO: 82 FL (ref 79–100)
PLATELET # BLD AUTO: 254 X10^3/UL (ref 140–400)
POTASSIUM SERPL-SCNC: 4.1 MMOL/L (ref 3.5–5.1)
RBC # BLD AUTO: 5.23 X10^6/UL (ref 4.3–5.7)
SODIUM SERPL-SCNC: 142 MMOL/L (ref 136–145)
WBC # BLD AUTO: 6.2 X10^3/UL (ref 4–11)

## 2017-02-09 RX ADMIN — IPRATROPIUM BROMIDE AND ALBUTEROL SULFATE SCH ML: .5; 3 SOLUTION RESPIRATORY (INHALATION) at 12:28

## 2017-02-09 RX ADMIN — LEVOFLOXACIN SCH MG: 500 TABLET, FILM COATED ORAL at 05:53

## 2017-02-09 RX ADMIN — IPRATROPIUM BROMIDE AND ALBUTEROL SULFATE SCH ML: .5; 3 SOLUTION RESPIRATORY (INHALATION) at 08:55

## 2017-02-09 RX ADMIN — ARIPIPRAZOLE SCH MG: 2 TABLET ORAL at 08:52

## 2017-02-09 RX ADMIN — INSULIN ASPART SCH UNITS: 100 INJECTION, SOLUTION INTRAVENOUS; SUBCUTANEOUS at 08:00

## 2017-02-09 RX ADMIN — ALPRAZOLAM PRN MG: 0.5 TABLET ORAL at 09:26

## 2017-02-09 RX ADMIN — AMLODIPINE BESYLATE SCH MG: 5 TABLET ORAL at 08:53

## 2017-02-09 RX ADMIN — LISINOPRIL SCH MG: 40 TABLET ORAL at 08:53

## 2017-02-09 RX ADMIN — SERTRALINE HYDROCHLORIDE SCH MG: 50 TABLET ORAL at 08:53

## 2017-02-09 RX ADMIN — INSULIN ASPART SCH UNITS: 100 INJECTION, SOLUTION INTRAVENOUS; SUBCUTANEOUS at 08:59

## 2017-02-09 RX ADMIN — DILTIAZEM HYDROCHLORIDE SCH MG: 180 CAPSULE, EXTENDED RELEASE ORAL at 08:53

## 2017-02-09 RX ADMIN — METFORMIN HYDROCHLORIDE SCH MG: 500 TABLET, FILM COATED ORAL at 08:52

## 2017-02-09 RX ADMIN — PREDNISONE SCH MG: 20 TABLET ORAL at 08:52

## 2017-02-09 RX ADMIN — ASPIRIN 81 MG CHEWABLE TABLET SCH MG: 81 TABLET CHEWABLE at 08:53

## 2017-02-09 RX ADMIN — Medication SCH MG: at 08:53

## 2017-02-09 RX ADMIN — PANTOPRAZOLE SODIUM SCH MG: 40 TABLET, DELAYED RELEASE ORAL at 08:53

## 2017-02-09 NOTE — PDOC
PULMONARY PROGRESS NOTES


Subjective


feels much better


Vitals





 Vital Signs








  Date Time  Temp Pulse Resp B/P Pulse Ox O2 Delivery O2 Flow Rate FiO2


 


2/9/17 10:58 96.7 67 18 103/64 95 Nasal Cannula 4.0 





 96.7       








General:  Alert, No acute distress


Lungs:  Other (decrease bs)


Cardiovascular:  S1


Abdomen:  Soft


Neuro Exam:  Alert


Extremities:  No Edema


Skin:  Warm


Labs





Laboratory Tests








Test


  2/7/17


11:13 2/7/17


16:49 2/7/17


21:07 2/8/17


08:19


 


Glucose (Fingerstick)


  331mg/dL


(70-99) 148mg/dL


(70-99) 123mg/dL


(70-99) 150mg/dL


(70-99)














Test


  2/8/17


10:58 2/8/17


20:34 2/9/17


04:50 2/9/17


07:23


 


Glucose (Fingerstick)


  254mg/dL


(70-99) 207mg/dL


(70-99) 


  144mg/dL


(70-99)


 


White Blood Count


  


  


  6.2x10^3/uL


(4.0-11.0) 


 


 


Red Blood Count


  


  


  5.23x10^6/uL


(4.30-5.70) 


 


 


Hemoglobin


  


  


  14.5g/dL


(13.0-17.5) 


 


 


Hematocrit


  


  


  42.8%


(39.0-53.0) 


 


 


Mean Corpuscular Volume   82fL ()  


 


Mean Corpuscular Hemoglobin   28pg (25-35)  


 


Mean Corpuscular Hemoglobin


Concent 


  


  34g/dL (31-37) 


  


 


 


Red Cell Distribution Width


  


  


  18.0%


(11.5-14.5) 


 


 


Platelet Count


  


  


  254x10^3/uL


(140-400) 


 


 


Sodium Level


  


  


  142mmol/L


(136-145) 


 


 


Potassium Level


  


  


  4.1mmol/L


(3.5-5.1) 


 


 


Chloride Level


  


  


  104mmol/L


() 


 


 


Carbon Dioxide Level


  


  


  28mmol/L


(21-32) 


 


 


Anion Gap   10 (6-14)  


 


Blood Urea Nitrogen   26mg/dL (8-26)  


 


Creatinine


  


  


  1.1mg/dL


(0.7-1.3) 


 


 


Estimated GFR


(Cockcroft-Gault) 


  


  66.6 


  


 


 


Glucose Level


  


  


  162mg/dL


(70-99) 


 


 


Calcium Level


  


  


  9.4mg/dL


(8.5-10.1) 


 








Laboratory Tests








Test


  2/8/17


20:34 2/9/17


04:50 2/9/17


07:23


 


Glucose (Fingerstick)


  207mg/dL


(70-99) 


  144mg/dL


(70-99)


 


White Blood Count


  


  6.2x10^3/uL


(4.0-11.0) 


 


 


Red Blood Count


  


  5.23x10^6/uL


(4.30-5.70) 


 


 


Hemoglobin


  


  14.5g/dL


(13.0-17.5) 


 


 


Hematocrit


  


  42.8%


(39.0-53.0) 


 


 


Mean Corpuscular Volume  82fL ()  


 


Mean Corpuscular Hemoglobin  28pg (25-35)  


 


Mean Corpuscular Hemoglobin


Concent 


  34g/dL (31-37) 


  


 


 


Red Cell Distribution Width


  


  18.0%


(11.5-14.5) 


 


 


Platelet Count


  


  254x10^3/uL


(140-400) 


 


 


Sodium Level


  


  142mmol/L


(136-145) 


 


 


Potassium Level


  


  4.1mmol/L


(3.5-5.1) 


 


 


Chloride Level


  


  104mmol/L


() 


 


 


Carbon Dioxide Level


  


  28mmol/L


(21-32) 


 


 


Anion Gap  10 (6-14)  


 


Blood Urea Nitrogen  26mg/dL (8-26)  


 


Creatinine


  


  1.1mg/dL


(0.7-1.3) 


 


 


Estimated GFR


(Cockcroft-Gault) 


  66.6 


  


 


 


Glucose Level


  


  162mg/dL


(70-99) 


 


 


Calcium Level


  


  9.4mg/dL


(8.5-10.1) 


 








Medications





Active Scripts








 Medications  Dose


 Route/Sig Days Date Category


 


 Amlodipine


 Besylate 5 Mg


 Tablet  5 Mg


 PO DAILY   2/7/17 Reported


 


 Lantus Solostar


  (Insulin


 Glargine,Hum.rec.anlog)


 100 Unit/1 Ml


 Insuln.pen  60 Unit


 SQ QHS   2/6/17 Reported


 


 Pravastatin


 Sodium 20 Mg


 Tablet  1 Tab


 PO DAILY   2/6/17 Reported


 


 Omeprazole 20 Mg


 Capsule.dr  1 Cap


 PO BID   2/6/17 Reported


 


 Zoloft


  (Sertraline Hcl)


 50 Mg Tablet  1 Tab


 PO DAILY   2/6/17 Reported


 


 Furosemide 40 Mg


 Tablet  1 Tab


 PO DAILY   2/6/17 Reported


 


 Abilify


  (Aripiprazole) 2


 Mg Tablet  2 Mg


 PO DAILY   6/16/14 Rx


 


 Cardizem Cd


  (Diltiazem Hcl)


 180 Mg Cap.er.24h  180 Mg


 PO BID   6/14/14 Reported


 


 Digoxin 250 Mcg


 Tablet  250 Mcg


 PO DAILY   6/14/14 Reported


 


 Proair Hfa


 Inhaler


  (Albuterol


 Sulfate) 8.5 Gm


 Hfa.aer.ad  8.5 Gm


 IH PRN TID PRN   5/13/14 Reported


 


 Symbicort 160-4.5


 Mcg Inhaler


  (Budesonide/Formoterol


 Fumarate) 10.2 Gm


 Hfa.aer.ad  10.2 Gm


 IH BID   5/13/14 Reported


 


 NITROGLYCERIN


 SubLingual


  (Nitroglycerin)


 0.4 Mg Tab.subl  0.4 Mg


 SL PRN Q5MIN PRN   5/13/14 Reported


 


 Alprazolam 0.5 Mg


 Tab.rapdis  0.5 Mg


 PO PRN TID PRN   5/13/14 Reported


 


 Indigo Chewable


  (Aspirin) 81 Mg


 Tab.chew  81 Mg


 PO DAILY   5/13/14 Reported


 


 Metformin Hcl 500


 Mg Tablet  500 Mg


 PO TID   5/13/14 Reported


 


 Rapaflo


  (Silodosin) 8 Mg


 Capsule  8 Mg


 PO DAILY   5/13/14 Reported


 


 Lisinopril 40 Mg


 Tablet  40 Mg


 PO DAILY   5/13/14 Reported








Comments


CT CHEST


Stable mediastinal adenopathy.


                          Stable parenchymal opacity, probably reflecting


scar, in the right upper lobe.


                           8 mm groundglass opacity in the left upper lobe.


Additional groundglass opacity in the left lower lobe somewhat less well


defined. Follow-up imaging should be considered.


                           Minimal volume loss at the lung bases likely


reflecting atelectasis





Impression


.


1.  Dyspnea with acute hypoxic respiratory failure secondary to acute


exacerbation of chronic obstructive pulmonary disease.


2.  Thirty-five years of tobacco use up to 3 packs per day.  Suspect underlying


chronic obstructive pulmonary disease.


3.  History of atrial fibrillation, was on anticoagulation, but developed


significant hemostasis, and as a result was taken off.  He had bronchoscopies


done in the past as well.


4. Neg influenza.


5. ABNORMAL CT CHEST with 8 mm ground glass opacity in the left upper lobe.


Additional ground glass opacity in the left lower lobe somewhat less well


defined. most likely inflammatory





Plan


.





1.  Continue to slowly wean oxygen with keeping sats 92% to 94%.


2.  repeat ct chest in 4-6 months


3.  Continue bronchodilators.


4.  6 min walk today


5.  Weight loss is advised.


6.  ok with home today








ELEONORA ROUSE MD Feb 9, 2017 11:10

## 2017-02-09 NOTE — PDOC3
Discharge Summary*


Date of Admission:  Feb 6, 2017


Date of Discharge:  Feb 9, 2017


Admitting Diagnosis


 Problems


Medical Problems:


(1) COPD exacerbation


Status: Acute  





(2) SOB (shortness of breath)


Status: Acute  








Final Diagnosis


1. COPD exacerbation


   





2. Cardiomyapathy, 


   


3. DM


   -


Medical Problems:


(1) COPD exacerbation


Status: Acute  





(2) SOB (shortness of breath)


Status: Acute  





CONSULTS


pulmonary Dr. Batres


Cardiology Dr. Goodrich


Procedures


CXR


Brief Hospital Course


Mr. Pan  is a 66 old [sex] who presented with [ ]


Disposition/Orders:  D/C to Home


CONDITION AT DISCHARGE:  Improved


Diet:  Cardiac


Scheduled


Amlodipine Besylate (Amlodipine Besylate) 5 MG PO DAILY (Reported) 


Aripiprazole (Abilify) 2 MG PO DAILY 


Aspirin (Indigo Chewable) 81 MG PO DAILY (Reported) 


Budesonide/Formoterol Fumarate (Symbicort 160-4.5 Mcg Inhaler) 10.2 GM IH BID (

Reported) 


Digoxin (Digoxin) 250 MCG PO DAILY (Reported) 


Diltiazem Hcl (Cardizem Cd) 180 MG PO BID (Reported) 


Furosemide (Furosemide) 1 TAB PO DAILY (Reported) 


Insulin Glargine,Hum.rec.anlog (Lantus Solostar) 60 UNIT SQ QHS (Reported) 


Levofloxacin (Levaquin) 1 TAB PO DAILY 


Lisinopril (Lisinopril) 40 MG PO DAILY (Reported) 


Metformin Hcl (Metformin Hcl) 500 MG PO TID (Reported) 


Omeprazole (Omeprazole) 1 CAP PO BID (Reported) 


Pravastatin Sodium (Pravastatin Sodium) 1 TAB PO DAILY (Reported) 


Prednisone (Prednisone) 10 MG PO UD 


Sertraline Hcl (Zoloft) 1 TAB PO DAILY (Reported) 


Silodosin (Rapaflo) 8 MG PO DAILY (Reported) 





Scheduled PRN


Albuterol Sulfate (Proair Hfa Inhaler) 8.5 GM IH PRN TID PRN PRN SEE COMMENTS (

Reported) 


Alprazolam (Alprazolam) 0.5 MG PO PRN TID PRN PRN ANXIETY / AGITATION (Reported

) 


Nitroglycerin (NITROGLYCERIN SubLingual) 0.4 MG SL PRN Q5MIN PRN PRN CHEST PAIN 

(Reported) 





Discontinued Medications


Furosemide (Furosemide) 80 MG PO BID92 (Reported) 


Omeprazole (Omeprazole) 20 MG PO DAILY (Reported) 


Potassium Chloride (Klor-Con M20) 20 MEQ PO BID (Reported) 


Sertraline Hcl (Zoloft) 75 MG PO DAILY (Reported) 


Tiotropium Bromide (Spiriva) 18 MCG IH DAILY (Reported) 


FOLLOW UP APPOINTMENT:  


Dr. Hugo in 2 weeks


Time Spent


Total time spent with patient [] minutes for coordination of care, counseling, 

and education.








SIMONA SAMANIEGO MD Feb 9, 2017 13:04

## 2017-02-09 NOTE — PDOC
PROGRESS NOTES


Subjective


Subjective


Patient is doing very well today. He was sitting up in a chair and says he 

feels "much better." He denies SOB and chest pain at this time and is preparing 

to go home this afternoon with Abx and steroids.





Objective


Objective





 Vital Signs








  Date Time  Temp Pulse Resp B/P Pulse Ox O2 Delivery O2 Flow Rate FiO2


 


2/9/17 12:30      Room Air  


 


2/9/17 10:58 96.7 67 18 103/64 95  4.0 





 96.7       














 Intake and Output 


 


 2/9/17





 07:00


 


Intake Total 480 ml


 


Output Total 2425 ml


 


Balance -1945 ml


 


 


 


Intake Oral 480 ml


 


Output Urine Total 2425 ml











Physical Exam


Physical Exam


No change from previous cardiac exam. Air is moving more freely in the lungs 

now. Wheezing still present but very minimal at this time.





Assessment


Assessment


 Problems


Medical Problems:


(1) COPD exacerbation


Status: Acute  





(2) SOB (shortness of breath)


Status: Acute  











Plan


Plan of Care


CHF- Continue on home diuretics upon D/C.


COPD Exacerbation/Acute Bronchitis- Agree with plan to continue Abx, steroids, 

and O2 at home.





Thank you for giving me the opportunity to take part in the care of this 

patient.





Comment


Review of Relevant


I have reviewed the following items angi (where applicable) has been applied.


Labs





Laboratory Tests








Test


  2/7/17


16:49 2/7/17


21:07 2/8/17


08:19 2/8/17


10:58


 


Glucose (Fingerstick)


  148mg/dL


(70-99) 123mg/dL


(70-99) 150mg/dL


(70-99) 254mg/dL


(70-99)














Test


  2/8/17


20:34 2/9/17


04:50 2/9/17


07:23 2/9/17


11:54


 


Glucose (Fingerstick)


  207mg/dL


(70-99) 


  144mg/dL


(70-99) 130mg/dL


(70-99)


 


White Blood Count


  


  6.2x10^3/uL


(4.0-11.0) 


  


 


 


Red Blood Count


  


  5.23x10^6/uL


(4.30-5.70) 


  


 


 


Hemoglobin


  


  14.5g/dL


(13.0-17.5) 


  


 


 


Hematocrit


  


  42.8%


(39.0-53.0) 


  


 


 


Mean Corpuscular Volume  82fL ()   


 


Mean Corpuscular Hemoglobin  28pg (25-35)   


 


Mean Corpuscular Hemoglobin


Concent 


  34g/dL (31-37) 


  


  


 


 


Red Cell Distribution Width


  


  18.0%


(11.5-14.5) 


  


 


 


Platelet Count


  


  254x10^3/uL


(140-400) 


  


 


 


Sodium Level


  


  142mmol/L


(136-145) 


  


 


 


Potassium Level


  


  4.1mmol/L


(3.5-5.1) 


  


 


 


Chloride Level


  


  104mmol/L


() 


  


 


 


Carbon Dioxide Level


  


  28mmol/L


(21-32) 


  


 


 


Anion Gap  10 (6-14)   


 


Blood Urea Nitrogen  26mg/dL (8-26)   


 


Creatinine


  


  1.1mg/dL


(0.7-1.3) 


  


 


 


Estimated GFR


(Cockcroft-Gault) 


  66.6 


  


  


 


 


Glucose Level


  


  162mg/dL


(70-99) 


  


 


 


Calcium Level


  


  9.4mg/dL


(8.5-10.1) 


  


 








Laboratory Tests








Test


  2/8/17


20:34 2/9/17


04:50 2/9/17


07:23 2/9/17


11:54


 


Glucose (Fingerstick)


  207mg/dL


(70-99) 


  144mg/dL


(70-99) 130mg/dL


(70-99)


 


White Blood Count


  


  6.2x10^3/uL


(4.0-11.0) 


  


 


 


Red Blood Count


  


  5.23x10^6/uL


(4.30-5.70) 


  


 


 


Hemoglobin


  


  14.5g/dL


(13.0-17.5) 


  


 


 


Hematocrit


  


  42.8%


(39.0-53.0) 


  


 


 


Mean Corpuscular Volume  82fL ()   


 


Mean Corpuscular Hemoglobin  28pg (25-35)   


 


Mean Corpuscular Hemoglobin


Concent 


  34g/dL (31-37) 


  


  


 


 


Red Cell Distribution Width


  


  18.0%


(11.5-14.5) 


  


 


 


Platelet Count


  


  254x10^3/uL


(140-400) 


  


 


 


Sodium Level


  


  142mmol/L


(136-145) 


  


 


 


Potassium Level


  


  4.1mmol/L


(3.5-5.1) 


  


 


 


Chloride Level


  


  104mmol/L


() 


  


 


 


Carbon Dioxide Level


  


  28mmol/L


(21-32) 


  


 


 


Anion Gap  10 (6-14)   


 


Blood Urea Nitrogen  26mg/dL (8-26)   


 


Creatinine


  


  1.1mg/dL


(0.7-1.3) 


  


 


 


Estimated GFR


(Cockcroft-Gault) 


  66.6 


  


  


 


 


Glucose Level


  


  162mg/dL


(70-99) 


  


 


 


Calcium Level


  


  9.4mg/dL


(8.5-10.1) 


  


 








Medications





 Current Medications


Albuterol/ Ipratropium (Duoneb) 3 ml 1X  ONCE NEB  Last administered on 2/6/ 17at 19:47;  Start 2/6/17 at 19:30;  Stop 2/6/17 at 19:31;  Status DC


Prednisone (Prednisone) 50 mg 1X  ONCE PO  Last administered on 2/6/17at 19:45;

  Start 2/6/17 at 19:30;  Stop 2/6/17 at 19:31;  Status DC


Ondansetron HCl (Zofran) 4 mg PRN Q8HRS  PRN IV NAUSEA/VOMITING;  Start 2/6/17 

at 19:30;  Stop 2/7/17 at 19:29;  Status DC


Morphine Sulfate 2 mg PRN Q2HR  PRN IV PAIN;  Start 2/6/17 at 19:30;  Stop 2/7/ 17 at 19:29;  Status DC


Nitroglycerin (Nitrostat) 0.4 mg PRN Q5MIN  PRN SL CHEST PAIN;  Start 2/6/17 at 

19:30;  Stop 2/7/17 at 19:29;  Status DC


Albuterol/ Ipratropium (Duoneb) 3 ml RTQID NEB  Last administered on 2/7/17at 19

:55;  Start 2/6/17 at 20:00;  Stop 2/7/17 at 19:59;  Status DC


Alprazolam (Xanax) 0.5 mg PRN Q8HRS  PRN PO ANXIETY / AGITATION Last 

administered on 2/9/17at 09:26;  Start 2/7/17 at 00:30


Aripiprazole (Abilify) 2 mg DAILY PO  Last administered on 2/9/17at 08:52;  

Start 2/7/17 at 09:00


Aspirin (Children'S Aspirin) 81 mg DAILY PO  Last administered on 2/9/17at 08:53

;  Start 2/7/17 at 09:00


Digoxin (Lanoxin) 250 mcg DAILY PO  Last administered on 2/9/17at 08:52;  Start 

2/7/17 at 09:00


Diltiazem HCl (Cardizem 24hr Cd) 180 mg BID PO  Last administered on 2/9/17at 08

:53;  Start 2/7/17 at 09:00


Furosemide (Lasix) 40 mg DAILY PO  Last administered on 2/9/17at 08:53;  Start 2 /7/17 at 09:00


Lisinopril (Prinivil) 40 mg DAILY PO  Last administered on 2/9/17at 08:53;  

Start 2/7/17 at 09:00


Metformin HCl (Glucophage) 500 mg TID PO  Last administered on 2/9/17at 08:52;  

Start 2/7/17 at 09:00


Sertraline HCl (Zoloft) 50 mg DAILY PO  Last administered on 2/9/17at 08:53;  

Start 2/7/17 at 09:00


Insulin Detemir (Levemir) 60 units QHS SQ  Last administered on 2/8/17at 20:52;

  Start 2/7/17 at 21:00


Pantoprazole Sodium (Protonix) 40 mg DAILYAC PO  Last administered on 2/9/17at 

08:53;  Start 2/7/17 at 07:30


Atorvastatin Calcium (Lipitor) 5 mg QHS PO  Last administered on 2/8/17at 20:44

;  Start 2/7/17 at 21:00


Tamsulosin HCl (Flomax) 0.4 mg QHS PO  Last administered on 2/8/17at 20:45;  

Start 2/7/17 at 21:00


Amlodipine Besylate (Norvasc) 5 mg DAILY PO  Last administered on 2/9/17at 08:53

;  Start 2/7/17 at 10:00


Insulin Aspart (Novolog) 0-7 UNITS TIDWMEALS SQ  Last administered on 2/8/17at 

17:41;  Start 2/7/17 at 13:00


Dextrose 12.5 gm PRN Q15MIN  PRN IV SEE COMMENTS;  Start 2/7/17 at 13:00


Albuterol/ Ipratropium (Duoneb) 3 ml RTQID NEB  Last administered on 2/9/17at 12

:28;  Start 2/8/17 at 08:00


Albuterol/ Ipratropium (Duoneb) 3 ml 1X  ONCE NEB  Last administered on 2/8/ 17at 00:40;  Start 2/8/17 at 01:00;  Stop 2/8/17 at 01:01;  Status DC


Levofloxacin (Levaquin) 500 mg Q24H PO ;  Start 2/8/17 at 10:00;  Stop 2/8/17 

at 10:00;  Status DC


Prednisone (Prednisone) 40 mg DAILY PO ;  Start 2/8/17 at 10:00;  Status Cancel


Insulin Aspart (Novolog) 10 units TIDAC SQ  Last administered on 2/9/17at 08:59

;  Start 2/8/17 at 11:30


Levofloxacin (Levaquin) 500 mg DAILY06 PO  Last administered on 2/9/17at 05:53;

  Start 2/8/17 at 10:00


Prednisone (Prednisone) 40 mg DAILY PO  Last administered on 2/9/17at 08:52;  

Start 2/8/17 at 10:00


Furosemide (Lasix) 20 mg 1X  ONCE IVP  Last administered on 2/8/17at 10:13;  

Start 2/8/17 at 09:45;  Stop 2/8/17 at 09:46;  Status DC





Active Scripts


Active


Levaquin (Levofloxacin) 500 Mg Tablet 1 Tab PO DAILY


Prednisone 10 Mg Tablet 10 Mg PO UD


     Take 3 tablets by mouth twice a day for 3 days, then


     take 2 tablets by mouth twice a day for 3 days, then


     take 1 tablet by mouth twice a day for 3 days, then


     take 1 tablet by mouth daily x 3 days, then stop.


Abilify (Aripiprazole) 2 Mg Tablet 2 Mg PO DAILY


Reported


Amlodipine Besylate 5 Mg Tablet 5 Mg PO DAILY


Lantus Solostar (Insulin Glargine,Hum.rec.anlog) 100 Unit/1 Ml Insuln.pen 60 

Unit SQ QHS


Pravastatin Sodium 20 Mg Tablet 1 Tab PO DAILY


Omeprazole 20 Mg Capsule.dr 1 Cap PO BID


Zoloft (Sertraline Hcl) 50 Mg Tablet 1 Tab PO DAILY


Furosemide 40 Mg Tablet 1 Tab PO DAILY


Cardizem Cd (Diltiazem Hcl) 180 Mg Cap.er.24h 180 Mg PO BID


Digoxin 250 Mcg Tablet 250 Mcg PO DAILY


Proair Hfa Inhaler (Albuterol Sulfate) 8.5 Gm Hfa.aer.ad 8.5 Gm IH PRN TID PRN


Symbicort 160-4.5 Mcg Inhaler (Budesonide/Formoterol Fumarate) 10.2 Gm 

Hfa.aer.ad 10.2 Gm IH BID


NITROGLYCERIN SubLingual (Nitroglycerin) 0.4 Mg Tab.subl 0.4 Mg SL PRN Q5MIN PRN


Alprazolam 0.5 Mg Tab.rapdis 0.5 Mg PO PRN TID PRN


Indigo Chewable (Aspirin) 81 Mg Tab.chew 81 Mg PO DAILY


Metformin Hcl 500 Mg Tablet 500 Mg PO TID


Rapaflo (Silodosin) 8 Mg Capsule 8 Mg PO DAILY


Lisinopril 40 Mg Tablet 40 Mg PO DAILY


Vitals/I & O





 Vital Sign - Last 24 Hours








 2/8/17 2/8/17 2/8/17 2/8/17





 14:53 15:37 19:00 19:36


 


Temp  97.7 97.7 





  97.7 97.7 


 


Pulse  81 88 


 


Resp  19 18 


 


B/P  144/81 170/76 


 


Pulse Ox  96 97 97


 


O2 Delivery Nasal Cannula Room Air Room Air Nasal Cannula


 


O2 Flow Rate 3.0   4.0


 


    





    





 2/8/17 2/8/17 2/8/17 2/9/17





 20:00 20:44 23:00 07:00


 


Temp   96.3 96.9





   96.3 96.9


 


Pulse  88 82 71


 


Resp   18 20


 


B/P  170/76 145/70 155/72


 


Pulse Ox   97 95


 


O2 Delivery Nasal Cannula  Room Air Nasal Cannula


 


O2 Flow Rate 3.0   4.0


 


    





    





 2/9/17 2/9/17 2/9/17 2/9/17





 07:44 08:52 08:53 08:53


 


Pulse  71 71 71


 


B/P  155/72 155/72 155/72


 


O2 Delivery Nasal Cannula   


 


O2 Flow Rate 3.0   





 2/9/17 2/9/17 2/9/17 2/9/17





 08:53 08:58 10:58 12:30


 


Temp   96.7 





   96.7 


 


Pulse 71  67 


 


Resp   18 


 


B/P 155/72  103/64 


 


Pulse Ox  97 95 


 


O2 Delivery  Room Air Nasal Cannula Room Air


 


O2 Flow Rate   4.0 














 Intake and Output   


 


 2/8/17 2/8/17 2/9/17





 15:00 23:00 07:00


 


Intake Total  120 ml 360 ml


 


Output Total  850 ml 1575 ml


 


Balance  -730 ml -1215 ml














MARIAM LEONARD MD Feb 9, 2017 13:22

## 2017-12-01 ENCOUNTER — HOSPITAL ENCOUNTER (INPATIENT)
Dept: HOSPITAL 61 - ER | Age: 67
LOS: 6 days | Discharge: HOME | DRG: 253 | End: 2017-12-07
Attending: FAMILY MEDICINE | Admitting: FAMILY MEDICINE
Payer: COMMERCIAL

## 2017-12-01 VITALS — SYSTOLIC BLOOD PRESSURE: 147 MMHG | DIASTOLIC BLOOD PRESSURE: 115 MMHG

## 2017-12-01 VITALS — WEIGHT: 208.06 LBS | BODY MASS INDEX: 29.78 KG/M2 | HEIGHT: 70 IN

## 2017-12-01 VITALS — DIASTOLIC BLOOD PRESSURE: 72 MMHG | SYSTOLIC BLOOD PRESSURE: 132 MMHG

## 2017-12-01 VITALS — DIASTOLIC BLOOD PRESSURE: 73 MMHG | SYSTOLIC BLOOD PRESSURE: 145 MMHG

## 2017-12-01 DIAGNOSIS — E11.40: ICD-10-CM

## 2017-12-01 DIAGNOSIS — M19.90: ICD-10-CM

## 2017-12-01 DIAGNOSIS — J44.9: ICD-10-CM

## 2017-12-01 DIAGNOSIS — F41.9: ICD-10-CM

## 2017-12-01 DIAGNOSIS — Z83.3: ICD-10-CM

## 2017-12-01 DIAGNOSIS — Z91.040: ICD-10-CM

## 2017-12-01 DIAGNOSIS — Z82.49: ICD-10-CM

## 2017-12-01 DIAGNOSIS — E78.00: ICD-10-CM

## 2017-12-01 DIAGNOSIS — Z88.6: ICD-10-CM

## 2017-12-01 DIAGNOSIS — I50.9: ICD-10-CM

## 2017-12-01 DIAGNOSIS — N17.9: ICD-10-CM

## 2017-12-01 DIAGNOSIS — Z88.8: ICD-10-CM

## 2017-12-01 DIAGNOSIS — F32.9: ICD-10-CM

## 2017-12-01 DIAGNOSIS — Z95.1: ICD-10-CM

## 2017-12-01 DIAGNOSIS — I11.0: ICD-10-CM

## 2017-12-01 DIAGNOSIS — I48.91: ICD-10-CM

## 2017-12-01 DIAGNOSIS — E11.51: Primary | ICD-10-CM

## 2017-12-01 DIAGNOSIS — L03.115: ICD-10-CM

## 2017-12-01 DIAGNOSIS — Z87.891: ICD-10-CM

## 2017-12-01 DIAGNOSIS — E87.1: ICD-10-CM

## 2017-12-01 DIAGNOSIS — Z79.84: ICD-10-CM

## 2017-12-01 DIAGNOSIS — E87.5: ICD-10-CM

## 2017-12-01 DIAGNOSIS — K21.9: ICD-10-CM

## 2017-12-01 DIAGNOSIS — Z79.82: ICD-10-CM

## 2017-12-01 DIAGNOSIS — I70.221: ICD-10-CM

## 2017-12-01 DIAGNOSIS — G89.29: ICD-10-CM

## 2017-12-01 DIAGNOSIS — Z79.899: ICD-10-CM

## 2017-12-01 DIAGNOSIS — E11.65: ICD-10-CM

## 2017-12-01 DIAGNOSIS — I25.10: ICD-10-CM

## 2017-12-01 LAB
ALBUMIN SERPL-MCNC: 3.8 G/DL (ref 3.4–5)
ALBUMIN/GLOB SERPL: 1 {RATIO} (ref 1–1.7)
ALP SERPL-CCNC: 69 U/L (ref 46–116)
ALT SERPL-CCNC: 29 U/L (ref 16–63)
ANION GAP SERPL CALC-SCNC: 10 MMOL/L (ref 6–14)
APTT BLD: 29 SEC (ref 24–38)
AST SERPL-CCNC: 35 U/L (ref 15–37)
BASOPHILS # BLD AUTO: 0 X10^3/UL (ref 0–0.2)
BASOPHILS NFR BLD: 0 % (ref 0–3)
BILIRUB SERPL-MCNC: 0.7 MG/DL (ref 0.2–1)
BUN SERPL-MCNC: 37 MG/DL (ref 8–26)
BUN/CREAT SERPL: 23 (ref 6–20)
CALCIUM SERPL-MCNC: 9.4 MG/DL (ref 8.5–10.1)
CHLORIDE SERPL-SCNC: 98 MMOL/L (ref 98–107)
CO2 SERPL-SCNC: 25 MMOL/L (ref 21–32)
CREAT SERPL-MCNC: 1.6 MG/DL (ref 0.7–1.3)
EOSINOPHIL NFR BLD: 6 % (ref 0–3)
ERYTHROCYTE [DISTWIDTH] IN BLOOD BY AUTOMATED COUNT: 14.5 % (ref 11.5–14.5)
GFR SERPLBLD BASED ON 1.73 SQ M-ARVRAT: 43.1 ML/MIN
GLOBULIN SER-MCNC: 3.9 G/DL (ref 2.2–3.8)
GLUCOSE SERPL-MCNC: 223 MG/DL (ref 70–99)
HCT VFR BLD CALC: 46.8 % (ref 39–53)
HGB BLD-MCNC: 15.9 G/DL (ref 13–17.5)
INR PPP: 1.1 (ref 0.8–1.1)
LYMPHOCYTES # BLD: 2.1 X10^3/UL (ref 1–4.8)
LYMPHOCYTES NFR BLD AUTO: 23 % (ref 24–48)
MCH RBC QN AUTO: 29 PG (ref 25–35)
MCHC RBC AUTO-ENTMCNC: 34 G/DL (ref 31–37)
MCV RBC AUTO: 85 FL (ref 79–100)
MONOCYTES NFR BLD: 11 % (ref 0–9)
NEUTROPHILS NFR BLD AUTO: 60 % (ref 31–73)
PLATELET # BLD AUTO: 196 X10^3/UL (ref 140–400)
POTASSIUM SERPL-SCNC: 5.3 MMOL/L (ref 3.5–5.1)
PROT SERPL-MCNC: 7.7 G/DL (ref 6.4–8.2)
PROTHROMBIN TIME: 13.7 SEC (ref 11.7–14)
RBC # BLD AUTO: 5.5 X10^6/UL (ref 4.3–5.7)
SODIUM SERPL-SCNC: 133 MMOL/L (ref 136–145)
WBC # BLD AUTO: 9.1 X10^3/UL (ref 4–11)

## 2017-12-01 PROCEDURE — 75625 CONTRAST EXAM ABDOMINL AORTA: CPT

## 2017-12-01 PROCEDURE — G0269 OCCLUSIVE DEVICE IN VEIN ART: HCPCS

## 2017-12-01 PROCEDURE — 94250: CPT

## 2017-12-01 PROCEDURE — 71010: CPT

## 2017-12-01 PROCEDURE — 76937 US GUIDE VASCULAR ACCESS: CPT

## 2017-12-01 PROCEDURE — 93005 ELECTROCARDIOGRAM TRACING: CPT

## 2017-12-01 PROCEDURE — 85027 COMPLETE CBC AUTOMATED: CPT

## 2017-12-01 PROCEDURE — 85610 PROTHROMBIN TIME: CPT

## 2017-12-01 PROCEDURE — C1769 GUIDE WIRE: HCPCS

## 2017-12-01 PROCEDURE — C1892 INTRO/SHEATH,FIXED,PEEL-AWAY: HCPCS

## 2017-12-01 PROCEDURE — 80053 COMPREHEN METABOLIC PANEL: CPT

## 2017-12-01 PROCEDURE — C1894 INTRO/SHEATH, NON-LASER: HCPCS

## 2017-12-01 PROCEDURE — 94760 N-INVAS EAR/PLS OXIMETRY 1: CPT

## 2017-12-01 PROCEDURE — 99153 MOD SED SAME PHYS/QHP EA: CPT

## 2017-12-01 PROCEDURE — 99152 MOD SED SAME PHYS/QHP 5/>YRS: CPT

## 2017-12-01 PROCEDURE — 84484 ASSAY OF TROPONIN QUANT: CPT

## 2017-12-01 PROCEDURE — S0028 INJECTION, FAMOTIDINE, 20 MG: HCPCS

## 2017-12-01 PROCEDURE — 85025 COMPLETE CBC W/AUTO DIFF WBC: CPT

## 2017-12-01 PROCEDURE — 82962 GLUCOSE BLOOD TEST: CPT

## 2017-12-01 PROCEDURE — 04CK0ZZ EXTIRPATION OF MATTER FROM RIGHT FEMORAL ARTERY, OPEN APPROACH: ICD-10-PCS | Performed by: SPECIALIST

## 2017-12-01 PROCEDURE — 75710 ARTERY X-RAYS ARM/LEG: CPT

## 2017-12-01 PROCEDURE — 04UK0KZ SUPPLEMENT RIGHT FEMORAL ARTERY WITH NONAUTOLOGOUS TISSUE SUBSTITUTE, OPEN APPROACH: ICD-10-PCS | Performed by: SPECIALIST

## 2017-12-01 PROCEDURE — C1760 CLOSURE DEV, VASC: HCPCS

## 2017-12-01 PROCEDURE — 36415 COLL VENOUS BLD VENIPUNCTURE: CPT

## 2017-12-01 PROCEDURE — 93925 LOWER EXTREMITY STUDY: CPT

## 2017-12-01 PROCEDURE — 36246 INS CATH ABD/L-EXT ART 2ND: CPT

## 2017-12-01 PROCEDURE — 85730 THROMBOPLASTIN TIME PARTIAL: CPT

## 2017-12-01 PROCEDURE — 85520 HEPARIN ASSAY: CPT

## 2017-12-01 PROCEDURE — 93971 EXTREMITY STUDY: CPT

## 2017-12-01 PROCEDURE — 80048 BASIC METABOLIC PNL TOTAL CA: CPT

## 2017-12-01 PROCEDURE — 94640 AIRWAY INHALATION TREATMENT: CPT

## 2017-12-01 PROCEDURE — 83735 ASSAY OF MAGNESIUM: CPT

## 2017-12-01 PROCEDURE — A4215 STERILE NEEDLE: HCPCS

## 2017-12-01 RX ADMIN — FENTANYL CITRATE PRN MCG: 50 INJECTION INTRAMUSCULAR; INTRAVENOUS at 14:19

## 2017-12-01 RX ADMIN — FENTANYL CITRATE PRN MCG: 50 INJECTION INTRAMUSCULAR; INTRAVENOUS at 13:15

## 2017-12-01 RX ADMIN — FENTANYL CITRATE PRN MCG: 50 INJECTION INTRAMUSCULAR; INTRAVENOUS at 19:53

## 2017-12-01 RX ADMIN — ASPIRIN 325 MG ORAL TABLET ONE MG: 325 PILL ORAL at 17:00

## 2017-12-01 RX ADMIN — INSULIN DETEMIR SCH UNITS: 100 INJECTION, SOLUTION SUBCUTANEOUS at 21:35

## 2017-12-01 RX ADMIN — ATORVASTATIN CALCIUM SCH MG: 10 TABLET, FILM COATED ORAL at 21:24

## 2017-12-01 RX ADMIN — ASPIRIN 325 MG ORAL TABLET ONE MG: 325 PILL ORAL at 18:08

## 2017-12-01 NOTE — RAD
Bilateral lower extremity arterial ultrasound, 12/1/2017:



History: Bilateral leg pain



Duplex evaluation of the major arteries in both lower extremities was

performed including grayscale, color flow and spectral Doppler analysis.



There or mild to moderate scattered atherosclerotic plaques bilaterally. 



On the right, the common femoral Doppler waveform is monophasic and dampened

compared to the left side. This suggests right iliac inflow disease. The right

superficial femoral and popliteal arteries are patent demonstrating dampened

monophasic Doppler waveforms. There is moderate calcific plaquing in the

distal right superficial femoral artery. No significant focal velocity

elevation is seen to suggest high-grade femoral-popliteal stenosis on the

right. 



In the right lower leg, patent posterior tibial, peroneal and anterior tibial

arteries are present demonstrating monophasic Doppler waveforms. A similar

weak monophasic Doppler waveform is present at the right dorsalis pedis artery

level. The peak systolic velocity in the right dorsalis pedis artery is 11

cm/s.



On the left, the common femoral and superficial femoral arteries demonstrate

triphasic Doppler waveforms. The mid and distal left superficial femoral

arteries demonstrate scattered plaques with monophasic Doppler waveforms of

the amplitude. No significant focal velocity acceleration is seen to suggest

high-grade focal stenosis.



In the left lower leg patent posterior tibial, anterior tibial and peroneal

arteries are present demonstrating monophasic Doppler waveforms. They are of

better amplitude in those seen on the right. A similar monophasic Doppler

waveform is present at the left dorsalis pedis artery level. The peak systolic

velocity in the left dorsalis pedis artery is 24 cm/s.





IMPRESSION:

1. Moderate scattered atherosclerotic plaquing.

2. Doppler findings suggesting iliac inflow disease on the right. 

3. Gradual degradation of the left femoral and popliteal Doppler waveforms

without evidence of high-grade focal stenosis.

4. Patent three vessel arterial runoff in both lower legs, demonstrating

abnormal monophasic Doppler waveforms, worse on the right.

## 2017-12-01 NOTE — RAD
Ultrasound venous Doppler



Indication:leg/foot pain/swelling, eval for DVT (R)   PVD (B)



Technique: Grayscale, color Doppler and spectral waveform ultrasound images of

the right lower extremity deep veins.



Comparison: None



Findings:

The interrogated lower extremity veins are compressible and demonstrate

evidence of blood flow with normal respiratory variation and response to

augmentation.



Impression:

No sonographic evidence of acute DVT of the interrogated lower extremity deep

veins.

## 2017-12-01 NOTE — RAD
Portable chest, 12/1/2017:



History: Shortness of breath



Comparison is made to a study from 2/6/2017. There has been a previous median

sternotomy. There are surgical sutures in the right upper chest. The heart is

at the upper limits of normal in size. The pulmonary vascularity is normal. No

pulmonary infiltrates are seen. There is no evidence of pleural fluid.



IMPRESSION: No acute cardiopulmonary abnormality is detected.

## 2017-12-01 NOTE — EKG
[facility]

              [address] [place] [address]

Test Date:    2017               Test Time:    13:03:43

Pat Name:     URIAH GARCÍA             Department:   

Patient ID:   [id number]           Room:          

Gender:       M                        Technician:   ALEXYS

:          1950               Requested By: JAZMÍN PELLETIER

Order Number: [id number]            Reading MD:   Erick Carbone MD

                                 Measurements

Intervals                              Axis          

Rate:         89                       P:            

MI:                                    QRS:          -13

QRSD:         102                      T:            121

QT:           342                                    

QTc:          417                                    

                           Interpretive Statements

POSSIBLE AFIB

NON-SPECIFIC ST/T CHANGES

Electronically Signed On 2017 15:39:52 CST by Erick Carbone MD

## 2017-12-01 NOTE — PHYS DOC
Past Medical History


Past Medical History:  A-Fib, Anxiety, Arthritis, CHF, COPD, Depression, 

Diabetes-Type II, High Cholesterol, Hypertension, Other


Additional Past Medical Histor:  chronic pain


Past Surgical History:  Coronary Bypass Surgery, Other


Additional Past Surgical Histo:  bladder, hernia, rotator cuff


Alcohol Use:  None


Drug Use:  None





Adult General


Chief Complaint


Chief Complaint:  LOWER EXT PAIN





HPI


HPI





Patient is a 67  year old male who presents with bilateral foot pain.  The 

patient reports right greater than left foot & lower leg pain.  Pain has been 

present for at least the past week, worsening today.  He states pain increases 

with ambulation even very short distances within a room of his home.  Also 

worse when he first gets up in the morning but not necessarily at night.  He 

reports erythema to bilateral feet & right calf.  Denies leg swelling.  He 

denies fevers/chills, nausea, vomiting, chest pain, shortness of breath, 

extremity numbness/weakness.  He has history of CAD s/p CABG, HTN, diabetes, 

former smoker.  He has been taking gabapentin for neuropathy but states his 

pain has become more severe.  PCP is Dr. Lowe, cardiologist is Dr. Carlton.





Review of Systems


Review of Systems


Constitutional: Denies fever or chills 


Eyes: Denies change in visual acuity


HENT: Denies nasal congestion or sore throat 


Respiratory: Denies cough or shortness of breath 


Cardiovascular:  Denies chest pain or edema


GI: Denies abdominal pain, nausea, vomiting, bloody stools or diarrhea


: Denies dysuria or hematuria 


Musculoskeletal: Reports lower extremity pain


Integument: Denies rash or skin lesions 


Neurologic: Denies headache, focal weakness or sensory changes 





All other systems were reviewed and found to be within normal limits, except as 

documented in this note.





Current Medications


Current Medications





Current Medications








 Medications


  (Trade)  Dose


 Ordered  Sig/Miguel Angel  Start Time


 Stop Time Status Last Admin


Dose Admin


 


 Fentanyl Citrate


  (Fentanyl 2ml


 Vial)  50 mcg  PRN Q15MIN  PRN  12/1/17 13:00


 12/2/17 12:59  12/1/17 19:53


50 MCG











Allergies


Allergies





Allergies








Coded Allergies Type Severity Reaction Last Updated Verified


 


  acetaminophen Allergy Intermediate  2/9/17 Yes


 


  hydrocodone Allergy Intermediate  2/9/17 Yes


 


  latex Allergy Intermediate itch 2/9/17 Yes


 


  oxycodone Allergy Intermediate  2/9/17 Yes


 


  morphine Allergy Unknown  12/1/17 Yes


 


  warfarin Allergy Unknown  12/1/17 Yes











Physical Exam


Physical Exam


Constitutional: Well developed, well nourished, no acute distress, non-toxic 

appearance. 


HENT: Normocephalic, atraumatic, bilateral external ears normal, oropharynx 

moist, nose normal.


Eyes: conjunctiva normal, no discharge.


Neck: supple, no stridor.


Cardiovascular:  irregularly irregular, normal rate, no murmurs, no edema. 


Lungs & Thorax:  LCTAB, no wheezing, no respiratory distress.


Abdomen: soft, nontender, nondistended.


Skin: erythema to feet bilaterally as above.


Back: No tenderness.


Extremities: bilateral lower extremities no deformity or swelling, erythema to 

feet & medial right calf.  not warm or cool to the touch.  no focal bony 

tenderness to knees, ankles, feet, right calf tenderness noted.  dp & pt pulses 

are faint but palpable on the left, difficult to palpate on the right.  cap 

refill is 2 sec bilaterally.  sensation intact to foot. 


Neurologic: Alert and oriented X 3, no focal deficits noted. 


Psychologic: Affect normal, judgement normal, mood normal.





Current Patient Data


Vital Signs





 Vital Signs








  Date Time  Temp Pulse Resp B/P (MAP) Pulse Ox O2 Delivery O2 Flow Rate FiO2


 


12/1/17 14:20  84 20 134/86 (102) 95   


 


12/1/17 12:25 98.2     Room Air  





 98.2       








Lab Values





 Laboratory Tests








Test


  12/1/17


13:15


 


White Blood Count


  9.1 x10^3/uL


(4.0-11.0)


 


Red Blood Count


  5.50 x10^6/uL


(4.30-5.70)


 


Hemoglobin


  15.9 g/dL


(13.0-17.5)


 


Hematocrit


  46.8 %


(39.0-53.0)


 


Mean Corpuscular Volume


  85 fL ()


 


 


Mean Corpuscular Hemoglobin 29 pg (25-35)  


 


Mean Corpuscular Hemoglobin


Concent 34 g/dL


(31-37)


 


Red Cell Distribution Width


  14.5 %


(11.5-14.5)


 


Platelet Count


  196 x10^3/uL


(140-400)


 


Neutrophils (%) (Auto) 60 % (31-73)  


 


Lymphocytes (%) (Auto) 23 % (24-48)  L


 


Monocytes (%) (Auto) 11 % (0-9)  H


 


Eosinophils (%) (Auto) 6 % (0-3)  H


 


Basophils (%) (Auto) 0 % (0-3)  


 


Neutrophils # (Auto)


  5.5 x10^3uL


(1.8-7.7)


 


Lymphocytes # (Auto)


  2.1 x10^3/uL


(1.0-4.8)


 


Monocytes # (Auto)


  1.0 x10^3/uL


(0.0-1.1)


 


Eosinophils # (Auto)


  0.5 x10^3/uL


(0.0-0.7)


 


Basophils # (Auto)


  0.0 x10^3/uL


(0.0-0.2)


 


Prothrombin Time


  13.7 SEC


(11.7-14.0)


 


Prothrombin Time INR 1.1 (0.8-1.1)  


 


PTT


  29 SEC (24-38)


 


 


Sodium Level


  133 mmol/L


(136-145)  L


 


Potassium Level


  5.3 mmol/L


(3.5-5.1)  H


 


Chloride Level


  98 mmol/L


()


 


Carbon Dioxide Level


  25 mmol/L


(21-32)


 


Anion Gap 10 (6-14)  


 


Blood Urea Nitrogen


  37 mg/dL


(8-26)  H


 


Creatinine


  1.6 mg/dL


(0.7-1.3)  H


 


Estimated GFR


(Cockcroft-Gault) 43.1  


 


 


BUN/Creatinine Ratio 23 (6-20)  H


 


Glucose Level


  223 mg/dL


(70-99)  H


 


Calcium Level


  9.4 mg/dL


(8.5-10.1)


 


Total Bilirubin


  0.7 mg/dL


(0.2-1.0)


 


Aspartate Amino Transferase


(AST) 35 U/L (15-37)


 


 


Alanine Aminotransferase (ALT)


  29 U/L (16-63)


 


 


Alkaline Phosphatase


  69 U/L


()


 


Troponin I Quantitative


  < 0.017 ng/mL


(0.000-0.055)


 


Total Protein


  7.7 g/dL


(6.4-8.2)


 


Albumin


  3.8 g/dL


(3.4-5.0)


 


Albumin/Globulin Ratio 1.0 (1.0-1.7)  





 Laboratory Tests


12/1/17 13:15








 Laboratory Tests


12/1/17 13:15














EKG


EKG


Interpreted by me: 1/3/03: Irregularly irregular rate 89, no acute ST or T wave 

changes, T waves inverted in 1 and aVL, normal intervals, no ectopy.[]





Radiology/Procedures


Radiology/Procedures


PROCEDURE: VENOUS LOWER EXTREMITY RIGHT





Ultrasound venous Doppler





Indication:leg/foot pain/swelling, eval for DVT (R)   PVD (B)





Technique: Grayscale, color Doppler and spectral waveform ultrasound images of


the right lower extremity deep veins.





Comparison: None





Findings:


The interrogated lower extremity veins are compressible and demonstrate


evidence of blood flow with normal respiratory variation and response to


augmentation.





Impression:


No sonographic evidence of acute DVT of the interrogated lower extremity deep


veins.














DICTATED and SIGNED BY:     NORA COYNE DO


DATE:     12/01/17 1402





PROCEDURE: DUPLEX LOWER EXTREMITY BILAT





Bilateral lower extremity arterial ultrasound, 12/1/2017:





History: Bilateral leg pain





Duplex evaluation of the major arteries in both lower extremities was


performed including grayscale, color flow and spectral Doppler analysis.





There or mild to moderate scattered atherosclerotic plaques bilaterally. 





On the right, the common femoral Doppler waveform is monophasic and dampened


compared to the left side. This suggests right iliac inflow disease. The right


superficial femoral and popliteal arteries are patent demonstrating dampened


monophasic Doppler waveforms. There is moderate calcific plaquing in the


distal right superficial femoral artery. No significant focal velocity


elevation is seen to suggest high-grade femoral-popliteal stenosis on the


right. 





In the right lower leg, patent posterior tibial, peroneal and anterior tibial


arteries are present demonstrating monophasic Doppler waveforms. A similar


weak monophasic Doppler waveform is present at the right dorsalis pedis artery


level. The peak systolic velocity in the right dorsalis pedis artery is 11


cm/s.





On the left, the common femoral and superficial femoral arteries demonstrate


triphasic Doppler waveforms. The mid and distal left superficial femoral


arteries demonstrate scattered plaques with monophasic Doppler waveforms of


the amplitude. No significant focal velocity acceleration is seen to suggest


high-grade focal stenosis.





In the left lower leg patent posterior tibial, anterior tibial and peroneal


arteries are present demonstrating monophasic Doppler waveforms. They are of


better amplitude in those seen on the right. A similar monophasic Doppler


waveform is present at the left dorsalis pedis artery level. The peak systolic


velocity in the left dorsalis pedis artery is 24 cm/s.








IMPRESSION:


1. Moderate scattered atherosclerotic plaquing.


2. Doppler findings suggesting iliac inflow disease on the right. 


3. Gradual degradation of the left femoral and popliteal Doppler waveforms


without evidence of high-grade focal stenosis.


4. Patent three vessel arterial runoff in both lower legs, demonstrating


abnormal monophasic Doppler waveforms, worse on the right.














DICTATED and SIGNED BY:     MORITZ,RICK S MD


DATE:     12/01/17 1415


[]





Course & Med Decision Making


Course & Med Decision Making


Pertinent Labs and Imaging studies reviewed. (See chart for details)


The patient presents with lower extremity pain greater on the right. Certainly 

could be neuropathic but with his history of cardiovascular disease also 

possibility of peripheral vascular disease. He has pain but palpable pulses and 

lower extremities, foot not cyanotic, but with diffuse foot pain particularly 

on the right. Obtained labs, ultrasound, chest x-ray. Does not appear to be 

acute arterial occlusion but with evidence of symptomatic peripheral vascular 

disease. He had some erythema on the right so will give him a dose of 

vancomycin. Discussed with Dr. Hankins of vascular surgery, in the absence of 

acute ischemia/thrombotic disease, does not recommend emergent angiography or 

need for anticoagulation at this time, will consult for further 

recommendations. I also discussed briefly with Dr. Carlton who knows the 

patient well. He did feel the vascular surgery be more appropriate to handle 

rather than from an interventional approach. Discussed with Dr. Lowe who 

agrees to admit to inpatient status. The patient is admitted in stable 

condition.





Dragon Disclaimer


Dragon Disclaimer


This electronic medical record was generated, in whole or in part, using a 

voice recognition dictation system.





Departure


Departure


Impression:  


 Primary Impression:  


 PVD (peripheral vascular disease)


 Additional Impressions:  


 Cellulitis


 Acute renal failure


 Hyperglycemia


Disposition:  09 ADMITTED AS INPATIENT


Admitting Physician:  Devin Lowe


Condition:  GUARDED


Referrals:  


DEVIN LOWE MD (PCP)





Problem Qualifiers











JAZMÍN PELLETIER MD Dec 1, 2017 14:55

## 2017-12-02 VITALS — SYSTOLIC BLOOD PRESSURE: 132 MMHG | DIASTOLIC BLOOD PRESSURE: 64 MMHG

## 2017-12-02 VITALS — DIASTOLIC BLOOD PRESSURE: 79 MMHG | SYSTOLIC BLOOD PRESSURE: 141 MMHG

## 2017-12-02 VITALS — SYSTOLIC BLOOD PRESSURE: 124 MMHG | DIASTOLIC BLOOD PRESSURE: 59 MMHG

## 2017-12-02 VITALS — SYSTOLIC BLOOD PRESSURE: 147 MMHG | DIASTOLIC BLOOD PRESSURE: 85 MMHG

## 2017-12-02 VITALS — SYSTOLIC BLOOD PRESSURE: 117 MMHG | DIASTOLIC BLOOD PRESSURE: 69 MMHG

## 2017-12-02 VITALS — DIASTOLIC BLOOD PRESSURE: 78 MMHG | SYSTOLIC BLOOD PRESSURE: 145 MMHG

## 2017-12-02 LAB
ANION GAP SERPL CALC-SCNC: 11 MMOL/L (ref 6–14)
BASOPHILS # BLD AUTO: 0.2 X10^3/UL (ref 0–0.2)
BASOPHILS NFR BLD: 3 % (ref 0–3)
BUN SERPL-MCNC: 35 MG/DL (ref 8–26)
CALCIUM SERPL-MCNC: 9.1 MG/DL (ref 8.5–10.1)
CHLORIDE SERPL-SCNC: 100 MMOL/L (ref 98–107)
CO2 SERPL-SCNC: 29 MMOL/L (ref 21–32)
CREAT SERPL-MCNC: 1.4 MG/DL (ref 0.7–1.3)
EOSINOPHIL NFR BLD: 7 % (ref 0–3)
ERYTHROCYTE [DISTWIDTH] IN BLOOD BY AUTOMATED COUNT: 14.5 % (ref 11.5–14.5)
GFR SERPLBLD BASED ON 1.73 SQ M-ARVRAT: 50.2 ML/MIN
GLUCOSE SERPL-MCNC: 101 MG/DL (ref 70–99)
HCT VFR BLD CALC: 47.8 % (ref 39–53)
HGB BLD-MCNC: 16.1 G/DL (ref 13–17.5)
LYMPHOCYTES # BLD: 1.8 X10^3/UL (ref 1–4.8)
LYMPHOCYTES NFR BLD AUTO: 27 % (ref 24–48)
MCH RBC QN AUTO: 29 PG (ref 25–35)
MCHC RBC AUTO-ENTMCNC: 34 G/DL (ref 31–37)
MCV RBC AUTO: 85 FL (ref 79–100)
MONOCYTES NFR BLD: 12 % (ref 0–9)
NEUTROPHILS NFR BLD AUTO: 51 % (ref 31–73)
PLATELET # BLD AUTO: 203 X10^3/UL (ref 140–400)
POTASSIUM SERPL-SCNC: 4.2 MMOL/L (ref 3.5–5.1)
RBC # BLD AUTO: 5.61 X10^6/UL (ref 4.3–5.7)
SODIUM SERPL-SCNC: 140 MMOL/L (ref 136–145)
WBC # BLD AUTO: 6.7 X10^3/UL (ref 4–11)

## 2017-12-02 RX ADMIN — FUROSEMIDE SCH MG: 40 TABLET ORAL at 12:30

## 2017-12-02 RX ADMIN — INSULIN DETEMIR SCH UNITS: 100 INJECTION, SOLUTION SUBCUTANEOUS at 20:47

## 2017-12-02 RX ADMIN — ASPIRIN 81 MG SCH MG: 81 TABLET ORAL at 07:40

## 2017-12-02 RX ADMIN — SPIRONOLACTONE SCH MG: 25 TABLET ORAL at 09:00

## 2017-12-02 RX ADMIN — FENTANYL CITRATE PRN MCG: 50 INJECTION INTRAMUSCULAR; INTRAVENOUS at 02:12

## 2017-12-02 RX ADMIN — ATORVASTATIN CALCIUM SCH MG: 10 TABLET, FILM COATED ORAL at 20:35

## 2017-12-02 RX ADMIN — LISINOPRIL SCH MG: 40 TABLET ORAL at 12:43

## 2017-12-02 RX ADMIN — ATENOLOL SCH MG: 50 TABLET ORAL at 12:29

## 2017-12-02 RX ADMIN — DIGOXIN SCH MCG: 250 TABLET ORAL at 12:32

## 2017-12-02 RX ADMIN — TAMSULOSIN HYDROCHLORIDE SCH MG: 0.4 CAPSULE ORAL at 12:37

## 2017-12-02 RX ADMIN — SERTRALINE HYDROCHLORIDE SCH MG: 50 TABLET ORAL at 12:29

## 2017-12-02 RX ADMIN — FENTANYL CITRATE PRN MCG: 50 INJECTION INTRAMUSCULAR; INTRAVENOUS at 20:36

## 2017-12-02 RX ADMIN — ALBUTEROL SULFATE SCH MG: 108 AEROSOL, METERED RESPIRATORY (INHALATION) at 20:35

## 2017-12-02 RX ADMIN — ALBUTEROL SULFATE SCH MG: 108 AEROSOL, METERED RESPIRATORY (INHALATION) at 06:12

## 2017-12-02 RX ADMIN — BUDESONIDE SCH MG: 0.5 INHALANT RESPIRATORY (INHALATION) at 20:38

## 2017-12-02 RX ADMIN — BUDESONIDE SCH MG: 0.5 INHALANT RESPIRATORY (INHALATION) at 06:11

## 2017-12-02 RX ADMIN — ALBUTEROL SULFATE SCH MG: 108 AEROSOL, METERED RESPIRATORY (INHALATION) at 15:54

## 2017-12-02 RX ADMIN — GABAPENTIN SCH MG: 300 CAPSULE ORAL at 12:29

## 2017-12-02 RX ADMIN — ALBUTEROL SULFATE SCH MG: 108 AEROSOL, METERED RESPIRATORY (INHALATION) at 11:25

## 2017-12-02 RX ADMIN — PANTOPRAZOLE SODIUM SCH MG: 40 TABLET, DELAYED RELEASE ORAL at 07:40

## 2017-12-02 NOTE — PDOC2
CONSULT


Date of Consult


Date of Consult


DATE: 12/2/17 


TIME: 12:20





Reason for Consult


Reason for Consult:


Cardiac hx





Referring Physician


Referring Physician:


Dr. Sai Hugo





Identification/Chief Complaint


Chief Complaint


PVD, Leg pain


Problems:  





Source


Source:  Chart review, Patient





History of Present Illness


Reason for Visit:


Patient is a 66yo male with a known history of CAD and CABG that has PVD. He 

comes in with severe pain in his R leg and was seen in the ED. Patient has no 

cardiac complaints at this time. LE Doppler was negative. Patient is being 

evaluated by vascular surgery.





Past Medical History


Past Medical History


CAD, s/p CABG, HTN, diabetes





Social History


ALCOHOL:  none


Drugs:  None





Current Problem List


Problem List


Problems


Medical Problems:


(1) Acute renal failure


Status: Acute  





(2) Cellulitis


Status: Acute  





(3) Hyperglycemia


Status: Acute  





(4) PVD (peripheral vascular disease)


Status: Acute  











Current Medications


Current Medications





Current Medications


Fentanyl Citrate (Fentanyl 2ml Vial) 50 mcg PRN Q15MIN  PRN IV PAIN GREATER 

THAN 3/10 Last administered on 12/2/17at 02:12;  Start 12/1/17 at 13:00;  Stop 

12/2/17 at 02:56;  Status DC


Hydromorphone HCl (Dilaudid) 1 mg PRN Q15MIN  PRN IV/SQ PAIN GREATER THAN 3/10;

  Start 12/1/17 at 15:00;  Stop 12/2/17 at 14:59


Ondansetron HCl (Zofran) 4 mg PRN Q8HRS  PRN IV NAUSEA/VOMITING;  Start 12/1/17 

at 15:30;  Stop 12/2/17 at 15:29


Hydromorphone HCl (Dilaudid) 0.5 mg PRN Q4HRS  PRN IV PAIN;  Start 12/1/17 at 15

:30


Aspirin (Indigo Aspirin) 325 mg 1X  ONCE PO ;  Start 12/1/17 at 17:00;  Stop 12/1 /17 at 17:03;  Status DC


Heparin Sodium (Porcine) (Heparin Sodium) 5,000 unit 1X  ONCE IV ;  Start 12/1/ 17 at 17:45;  Stop 12/1/17 at 17:46;  Status DC


Heparin Sodium/ Dextrose 500 ml @ 0 mls/hr CONT  PRN IV SEE I/O RECORD;  Start 

12/1/17 at 17:45


Tramadol HCl (Ultram) 50 mg PRN Q8HRS  PRN PO PAIN MILD TO MOD Last 

administered on 12/1/17at 21:25;  Start 12/1/17 at 19:30


Fentanyl Citrate (Fentanyl 2ml Vial) 50 mcg PRN Q2HR  PRN IV PAIN SEVERE;  

Start 12/1/17 at 19:30


Amlodipine Besylate (Norvasc) 5 mg DAILY PO ;  Start 12/2/17 at 09:00


Aripiprazole (Abilify) 2 mg DAILY PO ;  Start 12/2/17 at 09:00


Aspirin (Children'S Aspirin) 81 mg DAILY08 PO  Last administered on 12/2/17at 07

:40;  Start 12/2/17 at 08:00


Atenolol (Tenormin) 25 mg DAILY PO ;  Start 12/2/17 at 09:00


Digoxin (Lanoxin) 250 mcg DAILY PO ;  Start 12/2/17 at 09:00


Diltiazem HCl (Cardizem 24hr Cd) 180 mg DAILY PO ;  Start 12/2/17 at 09:00


Furosemide (Lasix) 40 mg DAILY PO ;  Start 12/2/17 at 09:00


Insulin Detemir (Levemir) 80 units HS SQ  Last administered on 12/1/17at 21:35;

  Start 12/1/17 at 21:00


Lisinopril (Prinivil) 40 mg DAILY PO ;  Start 12/2/17 at 09:00


Metformin HCl (Glucophage) 500 mg TIDWMEALS PO ;  Start 12/2/17 at 08:00;  Stop 

12/2/17 at 08:00;  Status DC


Nitroglycerin (Nitrostat) 0.4 mg PRN Q5MIN  PRN SL CHEST PAIN;  Start 12/1/17 

at 20:00


Sertraline HCl (Zoloft) 50 mg DAILY PO ;  Start 12/2/17 at 09:00


Spironolactone (Aldactone) 25 mg DAILY PO ;  Start 12/2/17 at 09:00


Albuterol Sulfate (Ventolin Neb Soln) 2.5 mg PRN TID  PRN NEB SOA;  Start 12/1/ 17 at 20:30


Alprazolam (Xanax) 0.5 mg PRN Q8HRS  PRN PO ANXIETY / AGITATION;  Start 12/1/17 

at 20:15


Non-Formulary Medication 10.2 gm BID IH ;  Start 12/1/17 at 21:00;  Status UNV


Albuterol Sulfate (Ventolin Neb Soln) 2.5 mg RTQID NEB  Last administered on 12/ 2/17at 11:25;  Start 12/2/17 at 08:00


Gabapentin (Neurontin) 300 mg DAILY PO ;  Start 12/2/17 at 09:00


Pantoprazole Sodium (Protonix) 40 mg DAILYAC PO  Last administered on 12/2/17at 

07:40;  Start 12/2/17 at 07:30


Atorvastatin Calcium (Lipitor) 5 mg QHS PO  Last administered on 12/1/17at 21:24

;  Start 12/1/17 at 21:00


Tamsulosin HCl (Flomax) 0.4 mg DAILY PO ;  Start 12/2/17 at 09:00


Budesonide (Pulmicort) 0.5 mg RTBID NEB  Last administered on 12/2/17at 06:11;  

Start 12/2/17 at 08:00


Aspirin (Indigo Aspirin) 325 mg 1X  ONCE PO  Last administered on 12/1/17at 21:24

;  Start 12/1/17 at 21:30;  Stop 12/1/17 at 21:31;  Status DC





Active Scripts


Active


Abilify (Aripiprazole) 2 Mg Tablet 2 Mg PO DAILY


Reported


Levemir Flextouch (Insulin Detemir) 100 Unit/1 Ml Insuln.pen 80 Unit SQ HS


Advair 250-50 Diskus (Fluticasone/Salmeterol) 1 Each Disk.w.dev 1 Puff IH BID


Atenolol 25 Mg Tablet 1 Tab PO DAILY


Spironolactone 25 Mg Tablet 1 Tab PO DAILY


Gabapentin 300 Mg Capsule 300 Mg PO DAILY


Amlodipine Besylate 5 Mg Tablet 5 Mg PO DAILY


Pravastatin Sodium 20 Mg Tablet 1 Tab PO DAILY


Omeprazole 20 Mg Capsule.dr 1 Cap PO BID


Zoloft (Sertraline Hcl) 50 Mg Tablet 1 Tab PO DAILY


Furosemide 40 Mg Tablet 1 Tab PO DAILY


Cardizem Cd (Diltiazem Hcl) 180 Mg Cap.er.24h 180 Mg PO DAILY


Digoxin 250 Mcg Tablet 250 Mcg PO DAILY


Proair Hfa Inhaler (Albuterol Sulfate) 8.5 Gm Hfa.aer.ad 8.5 Gm IH PRN TID PRN


Symbicort 160-4.5 Mcg Inhaler (Budesonide/Formoterol Fumarate) 10.2 Gm 

Hfa.aer.ad 10.2 Gm IH BID


NITROGLYCERIN SubLingual (Nitroglycerin) 0.4 Mg Tab.subl 0.4 Mg SL PRN Q5MIN PRN


Alprazolam 0.5 Mg Tab.rapdis 0.5 Mg PO PRN TID PRN


Indigo Chewable (Aspirin) 81 Mg Tab.chew 81 Mg PO DAILY


Metformin Hcl 500 Mg Tablet 500 Mg PO TID


Rapaflo (Silodosin) 8 Mg Capsule 8 Mg PO DAILY


Lisinopril 40 Mg Tablet 40 Mg PO DAILY





Allergies


Allergies:  


Coded Allergies:  


     acetaminophen (Verified  Allergy, Intermediate, 2/9/17)


 DIZZINESS


     hydrocodone (Verified  Allergy, Intermediate, 2/9/17)


 DIZZINESS


     latex (Verified  Allergy, Intermediate, itch, 2/9/17)


     oxycodone (Verified  Allergy, Intermediate, 2/9/17)


 DIZZINESS


     morphine (Verified  Allergy, Unknown, 12/1/17)


     warfarin (Verified  Allergy, Unknown, 12/1/17)





Physical Exam


General:  Alert, Oriented X3, Cooperative, No acute distress


HEENT:  PERRLA


Lungs:  Clear to auscultation, Normal air movement


Heart:  Regular rate, Normal S1, Normal S2


Extremities:  Other (weak and thready pulses on R LE, and R foot. Diffuse TTP 

of R leg)





Vitals


VITALS





Vital Signs








  Date Time  Temp Pulse Resp B/P (MAP) Pulse Ox O2 Delivery O2 Flow Rate FiO2


 


12/2/17 11:25     96 Room Air  


 


12/2/17 11:00 97.7 82 18 132/64 (86)    





 97.7       











Labs


Labs





Laboratory Tests








Test


  12/1/17


13:15 12/1/17


21:07 12/2/17


03:45 12/2/17


04:30


 


White Blood Count


  9.1 x10^3/uL


(4.0-11.0) 


  6.7 x10^3/uL


(4.0-11.0) 


 


 


Red Blood Count


  5.50 x10^6/uL


(4.30-5.70) 


  5.61 x10^6/uL


(4.30-5.70) 


 


 


Hemoglobin


  15.9 g/dL


(13.0-17.5) 


  16.1 g/dL


(13.0-17.5) 


 


 


Hematocrit


  46.8 %


(39.0-53.0) 


  47.8 %


(39.0-53.0) 


 


 


Mean Corpuscular Volume 85 fL ()   85 fL ()  


 


Mean Corpuscular Hemoglobin 29 pg (25-35)   29 pg (25-35)  


 


Mean Corpuscular Hemoglobin


Concent 34 g/dL


(31-37) 


  34 g/dL


(31-37) 


 


 


Red Cell Distribution Width


  14.5 %


(11.5-14.5) 


  14.5 %


(11.5-14.5) 


 


 


Platelet Count


  196 x10^3/uL


(140-400) 


  203 x10^3/uL


(140-400) 


 


 


Neutrophils (%) (Auto) 60 % (31-73)   51 % (31-73)  


 


Lymphocytes (%) (Auto) 23 % (24-48)   27 % (24-48)  


 


Monocytes (%) (Auto) 11 % (0-9)   12 % (0-9)  


 


Eosinophils (%) (Auto) 6 % (0-3)   7 % (0-3)  


 


Basophils (%) (Auto) 0 % (0-3)   3 % (0-3)  


 


Neutrophils # (Auto)


  5.5 x10^3uL


(1.8-7.7) 


  3.4 x10^3uL


(1.8-7.7) 


 


 


Lymphocytes # (Auto)


  2.1 x10^3/uL


(1.0-4.8) 


  1.8 x10^3/uL


(1.0-4.8) 


 


 


Monocytes # (Auto)


  1.0 x10^3/uL


(0.0-1.1) 


  0.8 x10^3/uL


(0.0-1.1) 


 


 


Eosinophils # (Auto)


  0.5 x10^3/uL


(0.0-0.7) 


  0.5 x10^3/uL


(0.0-0.7) 


 


 


Basophils # (Auto)


  0.0 x10^3/uL


(0.0-0.2) 


  0.2 x10^3/uL


(0.0-0.2) 


 


 


Prothrombin Time


  13.7 SEC


(11.7-14.0) 


  


  


 


 


Prothromb Time International


Ratio 1.1 (0.8-1.1) 


  


  


  


 


 


Activated Partial


Thromboplast Time 29 SEC (24-38) 


  


  


  


 


 


Sodium Level


  133 mmol/L


(136-145) 


  


  140 mmol/L


(136-145)


 


Potassium Level


  5.3 mmol/L


(3.5-5.1) 


  


  4.2 mmol/L


(3.5-5.1)


 


Chloride Level


  98 mmol/L


() 


  


  100 mmol/L


()


 


Carbon Dioxide Level


  25 mmol/L


(21-32) 


  


  29 mmol/L


(21-32)


 


Anion Gap 10 (6-14)    11 (6-14) 


 


Blood Urea Nitrogen


  37 mg/dL


(8-26) 


  


  35 mg/dL


(8-26)


 


Creatinine


  1.6 mg/dL


(0.7-1.3) 


  


  1.4 mg/dL


(0.7-1.3)


 


Estimated GFR


(Cockcroft-Gault) 43.1 


  


  


  50.2 


 


 


BUN/Creatinine Ratio 23 (6-20)    


 


Glucose Level


  223 mg/dL


(70-99) 


  


  101 mg/dL


(70-99)


 


Calcium Level


  9.4 mg/dL


(8.5-10.1) 


  


  9.1 mg/dL


(8.5-10.1)


 


Total Bilirubin


  0.7 mg/dL


(0.2-1.0) 


  


  


 


 


Aspartate Amino Transf


(AST/SGOT) 35 U/L (15-37) 


  


  


  


 


 


Alanine Aminotransferase


(ALT/SGPT) 29 U/L (16-63) 


  


  


  


 


 


Alkaline Phosphatase


  69 U/L


() 


  


  


 


 


Troponin I Quantitative


  < 0.017 ng/mL


(0.000-0.055) 


  


  


 


 


Total Protein


  7.7 g/dL


(6.4-8.2) 


  


  


 


 


Albumin


  3.8 g/dL


(3.4-5.0) 


  


  


 


 


Albumin/Globulin Ratio 1.0 (1.0-1.7)    


 


Glucose (Fingerstick)


  


  197 mg/dL


(70-99) 


  


 


 


Test


  12/2/17


08:35 12/2/17


09:16 12/2/17


11:22 


 


 


Heparin Anti-Xa Act,


Unfractionated < 0.10 IU/mL


(0.30-0.70) 


  


  


 


 


Glucose (Fingerstick)


  


  75 mg/dL


(70-99) 199 mg/dL


(70-99) 


 








Laboratory Tests








Test


  12/1/17


13:15 12/1/17


21:07 12/2/17


03:45 12/2/17


04:30


 


White Blood Count


  9.1 x10^3/uL


(4.0-11.0) 


  6.7 x10^3/uL


(4.0-11.0) 


 


 


Red Blood Count


  5.50 x10^6/uL


(4.30-5.70) 


  5.61 x10^6/uL


(4.30-5.70) 


 


 


Hemoglobin


  15.9 g/dL


(13.0-17.5) 


  16.1 g/dL


(13.0-17.5) 


 


 


Hematocrit


  46.8 %


(39.0-53.0) 


  47.8 %


(39.0-53.0) 


 


 


Mean Corpuscular Volume 85 fL ()   85 fL ()  


 


Mean Corpuscular Hemoglobin 29 pg (25-35)   29 pg (25-35)  


 


Mean Corpuscular Hemoglobin


Concent 34 g/dL


(31-37) 


  34 g/dL


(31-37) 


 


 


Red Cell Distribution Width


  14.5 %


(11.5-14.5) 


  14.5 %


(11.5-14.5) 


 


 


Platelet Count


  196 x10^3/uL


(140-400) 


  203 x10^3/uL


(140-400) 


 


 


Neutrophils (%) (Auto) 60 % (31-73)   51 % (31-73)  


 


Lymphocytes (%) (Auto) 23 % (24-48)   27 % (24-48)  


 


Monocytes (%) (Auto) 11 % (0-9)   12 % (0-9)  


 


Eosinophils (%) (Auto) 6 % (0-3)   7 % (0-3)  


 


Basophils (%) (Auto) 0 % (0-3)   3 % (0-3)  


 


Neutrophils # (Auto)


  5.5 x10^3uL


(1.8-7.7) 


  3.4 x10^3uL


(1.8-7.7) 


 


 


Lymphocytes # (Auto)


  2.1 x10^3/uL


(1.0-4.8) 


  1.8 x10^3/uL


(1.0-4.8) 


 


 


Monocytes # (Auto)


  1.0 x10^3/uL


(0.0-1.1) 


  0.8 x10^3/uL


(0.0-1.1) 


 


 


Eosinophils # (Auto)


  0.5 x10^3/uL


(0.0-0.7) 


  0.5 x10^3/uL


(0.0-0.7) 


 


 


Basophils # (Auto)


  0.0 x10^3/uL


(0.0-0.2) 


  0.2 x10^3/uL


(0.0-0.2) 


 


 


Prothrombin Time


  13.7 SEC


(11.7-14.0) 


  


  


 


 


Prothromb Time International


Ratio 1.1 (0.8-1.1) 


  


  


  


 


 


Activated Partial


Thromboplast Time 29 SEC (24-38) 


  


  


  


 


 


Sodium Level


  133 mmol/L


(136-145) 


  


  140 mmol/L


(136-145)


 


Potassium Level


  5.3 mmol/L


(3.5-5.1) 


  


  4.2 mmol/L


(3.5-5.1)


 


Chloride Level


  98 mmol/L


() 


  


  100 mmol/L


()


 


Carbon Dioxide Level


  25 mmol/L


(21-32) 


  


  29 mmol/L


(21-32)


 


Anion Gap 10 (6-14)    11 (6-14) 


 


Blood Urea Nitrogen


  37 mg/dL


(8-26) 


  


  35 mg/dL


(8-26)


 


Creatinine


  1.6 mg/dL


(0.7-1.3) 


  


  1.4 mg/dL


(0.7-1.3)


 


Estimated GFR


(Cockcroft-Gault) 43.1 


  


  


  50.2 


 


 


BUN/Creatinine Ratio 23 (6-20)    


 


Glucose Level


  223 mg/dL


(70-99) 


  


  101 mg/dL


(70-99)


 


Calcium Level


  9.4 mg/dL


(8.5-10.1) 


  


  9.1 mg/dL


(8.5-10.1)


 


Total Bilirubin


  0.7 mg/dL


(0.2-1.0) 


  


  


 


 


Aspartate Amino Transf


(AST/SGOT) 35 U/L (15-37) 


  


  


  


 


 


Alanine Aminotransferase


(ALT/SGPT) 29 U/L (16-63) 


  


  


  


 


 


Alkaline Phosphatase


  69 U/L


() 


  


  


 


 


Troponin I Quantitative


  < 0.017 ng/mL


(0.000-0.055) 


  


  


 


 


Total Protein


  7.7 g/dL


(6.4-8.2) 


  


  


 


 


Albumin


  3.8 g/dL


(3.4-5.0) 


  


  


 


 


Albumin/Globulin Ratio 1.0 (1.0-1.7)    


 


Glucose (Fingerstick)


  


  197 mg/dL


(70-99) 


  


 


 


Test


  12/2/17


08:35 12/2/17


09:16 12/2/17


11:22 


 


 


Heparin Anti-Xa Act,


Unfractionated < 0.10 IU/mL


(0.30-0.70) 


  


  


 


 


Glucose (Fingerstick)


  


  75 mg/dL


(70-99) 199 mg/dL


(70-99) 


 











Assessment/Plan


Assessment/Plan


Patient with PVD that has disease of the [place] and probably below the knee as 

well. I agree with the vascular surgery evaluation. Patient may need to have an 

aortogram with run-off. Patient is compensated cardiac-wise.





Thank you for asking me to participate in this patients care. I will be happy 

to follow the patient throughout the duration of his hospitalization.











MARIAM LEONARD MD Dec 2, 2017 12:25

## 2017-12-02 NOTE — PDOC1
History and Physical


Date of Admission


Date of Admission


12/01/17





Identification/Chief Complaint


Chief Complaint


R foot pain, change of color to blue


Problems:  





Source


Source:  Caregiver, Chart review, Patient





History of Present Illness


History of Present Illness


Patient is a 67  year old male who presents with right  foot pain.  The patient 

reports right greater than left foot & lower leg pain.  Pain has been present 

for at least the past week, worsening now .  He states pain increases with 

ambulation even very short distances within a room of his home.  Also worse 

when he first gets up in the morning but not necessarily at night.  He reports 

erythema to bilateral feet & right calf.  Denies leg swelling.  He denies fevers

/chills, nausea, vomiting, chest pain, shortness of breath, extremity numbness/

weakness.  He has history of CAD s/p CABG, HTN, diabetes, former smoker.  He 

has been taking gabapentin for neuropathy but states his pain has become more 

severe.





Past Medical History


Cardiovascular:  AFIB, CAD, CHF, HTN, Hyperlipidemia


Pulmonary:  COPD


GI:  GERD


Heme/Onc:  No pertinent hx


Psych:  Anxiety, Depression


Rheumatologic:  Other (DJD)


Infectious disease:  No pertinent hx


ENT:  No pertinent hx


Renal/:  Benign prostatic enlarg., Other (bladder tumor removed)


Endocrine:  Diabetes


Dermatology:  No pertinent hx





Past Surgical History


Past Surgical History:  CABG, Cataract Removal, Other (orthopedic surgery)





Family History


Family History:  Diabetes, Heart Disease, High Cholestrol, Hypertension





Social History


Smoke:  Quit


ALCOHOL:  none


Drugs:  None





Current Problem List


Problem List


Problems


Medical Problems:


(1) Acute renal failure


Status: Acute  





(2) Cellulitis


Status: Acute  





(3) Hyperglycemia


Status: Acute  





(4) PVD (peripheral vascular disease)


Status: Acute  











Current Medications


Current Medications





Current Medications








 Medications


  (Trade)  Dose


 Ordered  Sig/Miguel Angel  Start Time


 Stop Time Status Last Admin


Dose Admin


 


 Albuterol Sulfate


  (Ventolin Neb


 Soln)  2.5 mg  RTQID  12/2/17 08:00


    12/2/17 11:25


2.5 MG


 


 Alprazolam


  (Xanax)  0.5 mg  PRN Q8HRS  PRN  12/1/17 20:15


     


 


 


 Amlodipine


 Besylate


  (Norvasc)  5 mg  1X  ONCE  12/2/17 13:30


 12/2/17 13:31 DC  


 


 


 Aripiprazole


  (Abilify)  2 mg  QHS  12/3/17 21:00


     


 


 


 Aspirin


  (Indigo Aspirin)  325 mg  1X  ONCE  12/1/17 21:30


 12/1/17 21:31 DC 12/1/17 21:24


325 MG


 


 Aspirin


  (Children'S


 Aspirin)  81 mg  DAILY08  12/2/17 08:00


    12/2/17 07:40


81 MG


 


 Atenolol


  (Tenormin)  25 mg  DAILY  12/2/17 09:00


    12/2/17 12:29


25 MG


 


 Atorvastatin


 Calcium


  (Lipitor)  5 mg  QHS  12/1/17 21:00


    12/1/17 21:24


5 MG


 


 Budesonide


  (Pulmicort)  0.5 mg  RTBID  12/2/17 08:00


    12/2/17 06:11


0.5 MG


 


 Digoxin


  (Lanoxin)  250 mcg  DAILY  12/2/17 09:00


    12/2/17 12:32


250 MCG


 


 Diltiazem HCl


  (Cardizem 24hr


 Cd)  180 mg  DAILY  12/2/17 09:00


 12/2/17 13:15 DC  


 


 


 Fentanyl Citrate


  (Fentanyl 2ml


 Vial)  50 mcg  PRN Q2HR  PRN  12/1/17 19:30


     


 


 


 Furosemide


  (Lasix)  40 mg  DAILY  12/2/17 09:00


    12/2/17 12:30


40 MG


 


 Gabapentin


  (Neurontin)  300 mg  DAILY  12/2/17 09:00


    12/2/17 12:29


300 MG


 


 Heparin Sodium


  (Porcine)


  (Heparin Sodium)  5,000 unit  1X  ONCE  12/1/17 17:45


 12/1/17 17:46 DC  


 


 


 Heparin Sodium/


 Dextrose  500 ml @ 0


 mls/hr  CONT  PRN  12/1/17 17:45


     


 


 


 Hydromorphone HCl


  (Dilaudid)  0.5 mg  PRN Q4HRS  PRN  12/1/17 15:30


     


 


 


 Insulin Detemir


  (Levemir)  80 units  HS  12/1/17 21:00


    12/1/17 21:35


80 UNITS


 


 Lisinopril


  (Prinivil)  40 mg  DAILY  12/2/17 09:00


    12/2/17 12:43


40 MG


 


 Metformin HCl


  (Glucophage)  500 mg  TIDWMEALS  12/2/17 08:00


 12/2/17 08:00 DC  


 


 


 Nitroglycerin


  (Nitrostat)  0.4 mg  PRN Q5MIN  PRN  12/1/17 20:00


     


 


 


 Non-Formulary


 Medication  10.2 gm  BID  12/1/17 21:00


   UNV  


 


 


 Ondansetron HCl


  (Zofran)  4 mg  PRN Q8HRS  PRN  12/1/17 15:30


 12/2/17 15:29   


 


 


 Pantoprazole


 Sodium


  (Protonix)  40 mg  DAILYAC  12/2/17 07:30


    12/2/17 07:40


40 MG


 


 Sertraline HCl


  (Zoloft)  50 mg  DAILY  12/2/17 09:00


    12/2/17 12:29


50 MG


 


 Spironolactone


  (Aldactone)  25 mg  DAILY  12/2/17 09:00


     


 


 


 Tamsulosin HCl


  (Flomax)  0.4 mg  DAILY  12/2/17 09:00


    12/2/17 12:37


0.4 MG


 


 Tramadol HCl


  (Ultram)  50 mg  PRN Q8HRS  PRN  12/1/17 19:30


    12/2/17 12:37


50 MG











Allergies


Allergies





Allergies








Coded Allergies Type Severity Reaction Last Updated Verified


 


  acetaminophen Allergy Intermediate  2/9/17 Yes


 


  hydrocodone Allergy Intermediate  2/9/17 Yes


 


  latex Allergy Intermediate itch 2/9/17 Yes


 


  oxycodone Allergy Intermediate  2/9/17 Yes


 


  morphine Allergy Unknown  12/1/17 Yes


 


  warfarin Allergy Unknown  12/1/17 Yes











ROS


Review of System


CONSTITUTIONAL:        No fever or chills


EYES:                          No recent changes


SKIN:               No rash or itching


CARDIOVASCULAR:     No chest pain, syncope, palpitations, or edema


RESPIRATORY:            No SOB or cough


GASTROINTESTINAL:    No nausea, vomiting or abdominal pain


NEUROLOGICAL:          No headaches or weakness


ENDOCRINE:               No cold or heat intolerance


GENITOURINARY:         No urgency  of urination


MUSCULOSKELETAL:   + arthritis pain


LYMPHATICS:               No enlarged lymph nodes


PSYCHIATRIC:              No anxiety or depression





Physical Exam


Physical Exam


GEN.:    No apparent distress.  Alert and oriented.


HEENT:    Head is normocephalic, atraumatic


NECK:    Supple.  


LUNGS:    Clear to auscultation.


HEART:    RRR, S1, S2 present.  Peripheral pulse reduced RLE 


ABDOMEN:    Soft, nontender.  Positive bowel sounds.


EXTREMITIES:    Without any cyanosis.    


NEUROLOGIC:     Normal speech, normal tone


PSYCHIATRIC:    Normal affect, normal mood.


SKIN:   No ulcerations





Vitals


Vitals





Vital Signs








  Date Time  Temp Pulse Resp B/P (MAP) Pulse Ox O2 Delivery O2 Flow Rate FiO2


 


12/2/17 12:43  82  132/64    


 


12/2/17 12:37   20  96 Room Air  


 


12/2/17 11:00 97.7       





 97.7       











Labs


Labs





Laboratory Tests








Test


  12/1/17


13:15 12/1/17


21:07 12/2/17


03:45 12/2/17


04:30


 


White Blood Count


  9.1 x10^3/uL


(4.0-11.0) 


  6.7 x10^3/uL


(4.0-11.0) 


 


 


Red Blood Count


  5.50 x10^6/uL


(4.30-5.70) 


  5.61 x10^6/uL


(4.30-5.70) 


 


 


Hemoglobin


  15.9 g/dL


(13.0-17.5) 


  16.1 g/dL


(13.0-17.5) 


 


 


Hematocrit


  46.8 %


(39.0-53.0) 


  47.8 %


(39.0-53.0) 


 


 


Mean Corpuscular Volume 85 fL ()   85 fL ()  


 


Mean Corpuscular Hemoglobin 29 pg (25-35)   29 pg (25-35)  


 


Mean Corpuscular Hemoglobin


Concent 34 g/dL


(31-37) 


  34 g/dL


(31-37) 


 


 


Red Cell Distribution Width


  14.5 %


(11.5-14.5) 


  14.5 %


(11.5-14.5) 


 


 


Platelet Count


  196 x10^3/uL


(140-400) 


  203 x10^3/uL


(140-400) 


 


 


Neutrophils (%) (Auto) 60 % (31-73)   51 % (31-73)  


 


Lymphocytes (%) (Auto) 23 % (24-48)   27 % (24-48)  


 


Monocytes (%) (Auto) 11 % (0-9)   12 % (0-9)  


 


Eosinophils (%) (Auto) 6 % (0-3)   7 % (0-3)  


 


Basophils (%) (Auto) 0 % (0-3)   3 % (0-3)  


 


Neutrophils # (Auto)


  5.5 x10^3uL


(1.8-7.7) 


  3.4 x10^3uL


(1.8-7.7) 


 


 


Lymphocytes # (Auto)


  2.1 x10^3/uL


(1.0-4.8) 


  1.8 x10^3/uL


(1.0-4.8) 


 


 


Monocytes # (Auto)


  1.0 x10^3/uL


(0.0-1.1) 


  0.8 x10^3/uL


(0.0-1.1) 


 


 


Eosinophils # (Auto)


  0.5 x10^3/uL


(0.0-0.7) 


  0.5 x10^3/uL


(0.0-0.7) 


 


 


Basophils # (Auto)


  0.0 x10^3/uL


(0.0-0.2) 


  0.2 x10^3/uL


(0.0-0.2) 


 


 


Prothrombin Time


  13.7 SEC


(11.7-14.0) 


  


  


 


 


Prothromb Time International


Ratio 1.1 (0.8-1.1) 


  


  


  


 


 


Activated Partial


Thromboplast Time 29 SEC (24-38) 


  


  


  


 


 


Sodium Level


  133 mmol/L


(136-145) 


  


  140 mmol/L


(136-145)


 


Potassium Level


  5.3 mmol/L


(3.5-5.1) 


  


  4.2 mmol/L


(3.5-5.1)


 


Chloride Level


  98 mmol/L


() 


  


  100 mmol/L


()


 


Carbon Dioxide Level


  25 mmol/L


(21-32) 


  


  29 mmol/L


(21-32)


 


Anion Gap 10 (6-14)    11 (6-14) 


 


Blood Urea Nitrogen


  37 mg/dL


(8-26) 


  


  35 mg/dL


(8-26)


 


Creatinine


  1.6 mg/dL


(0.7-1.3) 


  


  1.4 mg/dL


(0.7-1.3)


 


Estimated GFR


(Cockcroft-Gault) 43.1 


  


  


  50.2 


 


 


BUN/Creatinine Ratio 23 (6-20)    


 


Glucose Level


  223 mg/dL


(70-99) 


  


  101 mg/dL


(70-99)


 


Calcium Level


  9.4 mg/dL


(8.5-10.1) 


  


  9.1 mg/dL


(8.5-10.1)


 


Total Bilirubin


  0.7 mg/dL


(0.2-1.0) 


  


  


 


 


Aspartate Amino Transf


(AST/SGOT) 35 U/L (15-37) 


  


  


  


 


 


Alanine Aminotransferase


(ALT/SGPT) 29 U/L (16-63) 


  


  


  


 


 


Alkaline Phosphatase


  69 U/L


() 


  


  


 


 


Troponin I Quantitative


  < 0.017 ng/mL


(0.000-0.055) 


  


  


 


 


Total Protein


  7.7 g/dL


(6.4-8.2) 


  


  


 


 


Albumin


  3.8 g/dL


(3.4-5.0) 


  


  


 


 


Albumin/Globulin Ratio 1.0 (1.0-1.7)    


 


Glucose (Fingerstick)


  


  197 mg/dL


(70-99) 


  


 


 


Test


  12/2/17


08:35 12/2/17


09:16 12/2/17


11:22 


 


 


Heparin Anti-Xa Act,


Unfractionated < 0.10 IU/mL


(0.30-0.70) 


  


  


 


 


Glucose (Fingerstick)


  


  75 mg/dL


(70-99) 199 mg/dL


(70-99) 


 








Laboratory Tests








Test


  12/1/17


21:07 12/2/17


03:45 12/2/17


04:30 12/2/17


08:35


 


Glucose (Fingerstick)


  197 mg/dL


(70-99) 


  


  


 


 


White Blood Count


  


  6.7 x10^3/uL


(4.0-11.0) 


  


 


 


Red Blood Count


  


  5.61 x10^6/uL


(4.30-5.70) 


  


 


 


Hemoglobin


  


  16.1 g/dL


(13.0-17.5) 


  


 


 


Hematocrit


  


  47.8 %


(39.0-53.0) 


  


 


 


Mean Corpuscular Volume  85 fL ()   


 


Mean Corpuscular Hemoglobin  29 pg (25-35)   


 


Mean Corpuscular Hemoglobin


Concent 


  34 g/dL


(31-37) 


  


 


 


Red Cell Distribution Width


  


  14.5 %


(11.5-14.5) 


  


 


 


Platelet Count


  


  203 x10^3/uL


(140-400) 


  


 


 


Neutrophils (%) (Auto)  51 % (31-73)   


 


Lymphocytes (%) (Auto)  27 % (24-48)   


 


Monocytes (%) (Auto)  12 % (0-9)   


 


Eosinophils (%) (Auto)  7 % (0-3)   


 


Basophils (%) (Auto)  3 % (0-3)   


 


Neutrophils # (Auto)


  


  3.4 x10^3uL


(1.8-7.7) 


  


 


 


Lymphocytes # (Auto)


  


  1.8 x10^3/uL


(1.0-4.8) 


  


 


 


Monocytes # (Auto)


  


  0.8 x10^3/uL


(0.0-1.1) 


  


 


 


Eosinophils # (Auto)


  


  0.5 x10^3/uL


(0.0-0.7) 


  


 


 


Basophils # (Auto)


  


  0.2 x10^3/uL


(0.0-0.2) 


  


 


 


Sodium Level


  


  


  140 mmol/L


(136-145) 


 


 


Potassium Level


  


  


  4.2 mmol/L


(3.5-5.1) 


 


 


Chloride Level


  


  


  100 mmol/L


() 


 


 


Carbon Dioxide Level


  


  


  29 mmol/L


(21-32) 


 


 


Anion Gap   11 (6-14)  


 


Blood Urea Nitrogen


  


  


  35 mg/dL


(8-26) 


 


 


Creatinine


  


  


  1.4 mg/dL


(0.7-1.3) 


 


 


Estimated GFR


(Cockcroft-Gault) 


  


  50.2 


  


 


 


Glucose Level


  


  


  101 mg/dL


(70-99) 


 


 


Calcium Level


  


  


  9.1 mg/dL


(8.5-10.1) 


 


 


Heparin Anti-Xa Act,


Unfractionated 


  


  


  < 0.10 IU/mL


(0.30-0.70)


 


Test


  12/2/17


09:16 12/2/17


11:22 


  


 


 


Glucose (Fingerstick)


  75 mg/dL


(70-99) 199 mg/dL


(70-99) 


  


 











VTE Prophylaxis Ordered


VTE Prophylaxis Devices:  Yes


VTE Pharmacological Prophylaxi:  Yes





Assessment/Plan


Assessment/Plan


1- possible RLE limb ischemia


2-CAD/A fib by hx/ CHF/ CABG/


3-DM II


4-HLD


5-HTN


6-COPD


7- X- Smoker











SIMONA SAMANIEGO MD Dec 2, 2017 14:12

## 2017-12-02 NOTE — CONS
DATE OF CONSULTATION:  12/02/2017



REASON FOR CONSULTATION:  Bilateral lower extremity leg pain, right more severe

than left.



HISTORY:  This is a 67-year-old diabetic male with history of underlying

coronary artery disease, who has had previous coronary artery bypass graft.  At

the time of his cardiac catheterization, he was noted to have some disease in

the common iliac artery on the right as well.



For the last 3 weeks, he has been complaining of increasing pain in both of his

feet, right more severe than left, associated with the color changes and

inability to walk.



He is admitted to the hospital for these issues and vascular consultation was

placed.



The patient has a remote history of tobacco use.  He has been tobacco free for

many years.



His medications include Levemir 80 units subQ at bedtime, Advair Diskus 1 puff

b.i.d., atenolol 25 mg daily, spironolactone 25 mg daily, Neurontin 300 mg

daily, amlodipine 5 mg daily, pravastatin 20 mg daily, omeprazole 20 mg daily,

Zoloft 50 mg daily, Lasix 40 mg daily, Cardizem- mg daily, digoxin 250 mcg

daily, albuterol inhaler t.i.d. and as needed, Symbicort inhaler b.i.d.,

nitroglycerin sublingual on a p.r.n. basis, alprazolam 0.5 mg t.i.d., aspirin 81

mg daily, metformin 500 mg p.o. t.i.d., Rapaflo 8 mg p.o. daily, lisinopril 40

mg p.o. daily.



He is allergic to TYLENOL, causes dizziness; LATEX; OXYCODONE and WARFARIN as

well as MORPHINE.



PHYSICAL EXAMINATION:

GENERAL:  Sitting in bed, no acute distress.

HEAD AND NECK:  Unremarkable.  Normal carotid upstrokes.  Soft carotid bruits

are noted.  He has a well-healed median sternotomy incision.

ABDOMEN:  Soft, no palpable organomegaly or masses.  No palpable aneurysm.

EXTREMITIES:  He has excellent radial pulses bilaterally.  In the right lower

extremity, has weakly palpable femoral pulse with absent popliteal and pedal

pulses.  On the left, he has normal femoral pulse with absent popliteal and

pedal pulses.  Both of his lower extremities are pink.  They are warm.  He has

an area in the medial aspect of his right leg just above the medial malleolus

where there is some erythema, some swelling and tenderness.

SKIN:  No skin breakdown or ulcerations are noted.



IMPRESSION:  Ischemic rest pain in his right foot.  Also, suggestion of

superficial phlebitis in his right lower extremity as well.



Given his pulse examination and current complaints, my recommendation is for

arteriographic evaluation and possible percutaneous intervention.  If his iliac

artery is patent on the right, then certainly iliac angioplasty and stent

deployment may be helpful at least correcting the inflow problem.  We will

review his arteriogram when complete and make further recommendations.



I have also requested a superficial venous ultrasound of his right lower

extremity and see if this area of tenderness coincides with an area of

superficial phlebitis.



We will review the above studies when completed and make further

recommendations.  Thank you for the consult.

 



______________________________

AMANDA VALADEZ MD



DR:  DIANE/[email or web address]  JOB#:  [id number] / [id number]

DD:  12/02/2017 14:58  DT:  12/02/2017 15:43

## 2017-12-02 NOTE — PDOC
Provider Note


Provider Note


VASCULAR CONSULT, Dictated





67-year-old diabetic male presents with bilateral lower extremity pain. Pain is 

more severe at night. Wakens him up at night. It is improved by placing his 

feet on the floor.





He is also noted some significant skin color changes in association with this.





His examination shows a diminished right femoral pulse. Normal left femoral 

pulse. Absent popliteal and absent pedal pulses.





He also has an area of erythema and swelling medial aspect of his right lower 

extremity just above the medial malleolus.





Have recommended arteriographic evaluation as well as superficial venous 

superficial venous ultrasound to rule out the presence of significant 

aortoiliac and runoff vascular disease as well as evaluate him for evidence of 

superficial phlebitis.





Will review the patient's arteriogram when complete. Thank you for this consult.





Full consult dictated











AMANDA VALADEZ MD Dec 2, 2017 15:01

## 2017-12-03 VITALS — SYSTOLIC BLOOD PRESSURE: 92 MMHG | DIASTOLIC BLOOD PRESSURE: 74 MMHG

## 2017-12-03 VITALS — DIASTOLIC BLOOD PRESSURE: 73 MMHG | SYSTOLIC BLOOD PRESSURE: 137 MMHG

## 2017-12-03 VITALS — DIASTOLIC BLOOD PRESSURE: 70 MMHG | SYSTOLIC BLOOD PRESSURE: 126 MMHG

## 2017-12-03 VITALS — SYSTOLIC BLOOD PRESSURE: 105 MMHG | DIASTOLIC BLOOD PRESSURE: 65 MMHG

## 2017-12-03 VITALS — SYSTOLIC BLOOD PRESSURE: 126 MMHG | DIASTOLIC BLOOD PRESSURE: 68 MMHG

## 2017-12-03 VITALS — DIASTOLIC BLOOD PRESSURE: 61 MMHG | SYSTOLIC BLOOD PRESSURE: 114 MMHG

## 2017-12-03 RX ADMIN — SERTRALINE HYDROCHLORIDE SCH MG: 50 TABLET ORAL at 08:36

## 2017-12-03 RX ADMIN — ALBUTEROL SULFATE SCH MG: 108 AEROSOL, METERED RESPIRATORY (INHALATION) at 06:59

## 2017-12-03 RX ADMIN — FENTANYL CITRATE PRN MCG: 50 INJECTION INTRAMUSCULAR; INTRAVENOUS at 04:04

## 2017-12-03 RX ADMIN — ASPIRIN 81 MG SCH MG: 81 TABLET ORAL at 08:33

## 2017-12-03 RX ADMIN — BUDESONIDE SCH MG: 0.5 INHALANT RESPIRATORY (INHALATION) at 07:00

## 2017-12-03 RX ADMIN — ALBUTEROL SULFATE SCH MG: 108 AEROSOL, METERED RESPIRATORY (INHALATION) at 10:54

## 2017-12-03 RX ADMIN — GABAPENTIN SCH MG: 300 CAPSULE ORAL at 08:33

## 2017-12-03 RX ADMIN — FUROSEMIDE SCH MG: 40 TABLET ORAL at 08:33

## 2017-12-03 RX ADMIN — ATORVASTATIN CALCIUM SCH MG: 10 TABLET, FILM COATED ORAL at 20:35

## 2017-12-03 RX ADMIN — ALBUTEROL SULFATE SCH MG: 108 AEROSOL, METERED RESPIRATORY (INHALATION) at 18:14

## 2017-12-03 RX ADMIN — LISINOPRIL SCH MG: 40 TABLET ORAL at 08:36

## 2017-12-03 RX ADMIN — TAMSULOSIN HYDROCHLORIDE SCH MG: 0.4 CAPSULE ORAL at 08:33

## 2017-12-03 RX ADMIN — PANTOPRAZOLE SODIUM SCH MG: 40 TABLET, DELAYED RELEASE ORAL at 08:36

## 2017-12-03 RX ADMIN — INSULIN DETEMIR SCH UNITS: 100 INJECTION, SOLUTION SUBCUTANEOUS at 20:43

## 2017-12-03 RX ADMIN — SPIRONOLACTONE SCH MG: 25 TABLET ORAL at 08:39

## 2017-12-03 RX ADMIN — ATENOLOL SCH MG: 50 TABLET ORAL at 08:35

## 2017-12-03 RX ADMIN — BUDESONIDE SCH MG: 0.5 INHALANT RESPIRATORY (INHALATION) at 18:14

## 2017-12-03 RX ADMIN — ALBUTEROL SULFATE SCH MG: 108 AEROSOL, METERED RESPIRATORY (INHALATION) at 16:00

## 2017-12-03 RX ADMIN — DIGOXIN SCH MCG: 250 TABLET ORAL at 08:35

## 2017-12-03 NOTE — RAD
Ultrasound right lower extremity



Indication: Medial right ankle redness and swelling.



Technique: Targeted real-time grayscale images were obtained over the medial

right ankle region per request of the ordering physician, with use of color

Doppler imaging and spectral analysis.  Static images were submitted for

interpretation.



Findings:

There is no evidence for superficial venous thrombosis. There is normal color

fill-in on Doppler images. There is also normal response to compression and

augmentation. An oval, 1.7 cm, fairly anechoic appearing fluid collection is

seen within the subcutaneous soft tissues of the medial right ankle in the

area of concern. No internal or peripheral blood flow is documented.



Impression: 

1. No evidence for superficial venous thrombosis along the medial right ankle.

2. A small, superficial, fairly simple appearing fluid collection is present

at the site of concern.

## 2017-12-03 NOTE — PDOC
PROGRESS NOTES


Subjective


Subjective


No cardiac complaints, continues to have right leg and foot pain





Objective


Objective





Vital Signs








  Date Time  Temp Pulse Resp B/P (MAP) Pulse Ox O2 Delivery O2 Flow Rate FiO2


 


12/3/17 11:55   20   Room Air  


 


12/3/17 11:00 97.4 67  105/65 (78) 100   





 97.4       














Intake and Output 


 


 12/3/17





 07:00


 


Intake Total 1430 ml


 


Balance 1430 ml


 


 


 


Intake Oral 1430 ml


 


# Voids 4











Physical Exam


Physical Exam


Neck: no JVD


Lungs: clear 


heart: no changes 


ext: no changes





Assessment


Assessment


Patient going for an aortogram with runoff on Monday morning, I agree with this 

approach. 





Problems


Medical Problems:


(1) Acute renal failure


Status: Acute  





(2) Cellulitis


Status: Acute  





(3) Hyperglycemia


Status: Acute  





(4) PVD (peripheral vascular disease)


Status: Acute  








Comment


Review of Relevant


I have reviewed the following items angi (where applicable) has been applied.


Labs





Laboratory Tests








Test


  12/1/17


21:07 12/2/17


03:45 12/2/17


04:30 12/2/17


08:35


 


Glucose (Fingerstick)


  197 mg/dL


(70-99) 


  


  


 


 


White Blood Count


  


  6.7 x10^3/uL


(4.0-11.0) 


  


 


 


Red Blood Count


  


  5.61 x10^6/uL


(4.30-5.70) 


  


 


 


Hemoglobin


  


  16.1 g/dL


(13.0-17.5) 


  


 


 


Hematocrit


  


  47.8 %


(39.0-53.0) 


  


 


 


Mean Corpuscular Volume  85 fL ()   


 


Mean Corpuscular Hemoglobin  29 pg (25-35)   


 


Mean Corpuscular Hemoglobin


Concent 


  34 g/dL


(31-37) 


  


 


 


Red Cell Distribution Width


  


  14.5 %


(11.5-14.5) 


  


 


 


Platelet Count


  


  203 x10^3/uL


(140-400) 


  


 


 


Neutrophils (%) (Auto)  51 % (31-73)   


 


Lymphocytes (%) (Auto)  27 % (24-48)   


 


Monocytes (%) (Auto)  12 % (0-9)   


 


Eosinophils (%) (Auto)  7 % (0-3)   


 


Basophils (%) (Auto)  3 % (0-3)   


 


Neutrophils # (Auto)


  


  3.4 x10^3uL


(1.8-7.7) 


  


 


 


Lymphocytes # (Auto)


  


  1.8 x10^3/uL


(1.0-4.8) 


  


 


 


Monocytes # (Auto)


  


  0.8 x10^3/uL


(0.0-1.1) 


  


 


 


Eosinophils # (Auto)


  


  0.5 x10^3/uL


(0.0-0.7) 


  


 


 


Basophils # (Auto)


  


  0.2 x10^3/uL


(0.0-0.2) 


  


 


 


Sodium Level


  


  


  140 mmol/L


(136-145) 


 


 


Potassium Level


  


  


  4.2 mmol/L


(3.5-5.1) 


 


 


Chloride Level


  


  


  100 mmol/L


() 


 


 


Carbon Dioxide Level


  


  


  29 mmol/L


(21-32) 


 


 


Anion Gap   11 (6-14)  


 


Blood Urea Nitrogen


  


  


  35 mg/dL


(8-26) 


 


 


Creatinine


  


  


  1.4 mg/dL


(0.7-1.3) 


 


 


Estimated GFR


(Cockcroft-Gault) 


  


  50.2 


  


 


 


Glucose Level


  


  


  101 mg/dL


(70-99) 


 


 


Calcium Level


  


  


  9.1 mg/dL


(8.5-10.1) 


 


 


Heparin Anti-Xa Act,


Unfractionated 


  


  


  < 0.10 IU/mL


(0.30-0.70)


 


Test


  12/2/17


09:16 12/2/17


11:22 12/2/17


17:28 12/2/17


20:38


 


Glucose (Fingerstick)


  75 mg/dL


(70-99) 199 mg/dL


(70-99) 111 mg/dL


(70-99) 128 mg/dL


(70-99)


 


Test


  12/3/17


07:36 12/3/17


11:45 


  


 


 


Glucose (Fingerstick)


  86 mg/dL


(70-99) 187 mg/dL


(70-99) 


  


 








Laboratory Tests








Test


  12/2/17


17:28 12/2/17


20:38 12/3/17


07:36 12/3/17


11:45


 


Glucose (Fingerstick)


  111 mg/dL


(70-99) 128 mg/dL


(70-99) 86 mg/dL


(70-99) 187 mg/dL


(70-99)








Medications





Current Medications


Fentanyl Citrate (Fentanyl 2ml Vial) 50 mcg PRN Q15MIN  PRN IV PAIN GREATER 

THAN 3/10 Last administered on 12/2/17at 02:12;  Start 12/1/17 at 13:00;  Stop 

12/2/17 at 02:56;  Status DC


Hydromorphone HCl (Dilaudid) 1 mg PRN Q15MIN  PRN IV/SQ PAIN GREATER THAN 3/10;

  Start 12/1/17 at 15:00;  Stop 12/2/17 at 14:59;  Status DC


Ondansetron HCl (Zofran) 4 mg PRN Q8HRS  PRN IV NAUSEA/VOMITING;  Start 12/1/17 

at 15:30;  Stop 12/2/17 at 15:29;  Status DC


Hydromorphone HCl (Dilaudid) 0.5 mg PRN Q4HRS  PRN IV PAIN;  Start 12/1/17 at 15

:30


Aspirin (Indigo Aspirin) 325 mg 1X  ONCE PO ;  Start 12/1/17 at 17:00;  Stop 12/1 /17 at 17:03;  Status DC


Heparin Sodium (Porcine) (Heparin Sodium) 5,000 unit 1X  ONCE IV ;  Start 12/1/ 17 at 17:45;  Stop 12/1/17 at 17:46;  Status DC


Heparin Sodium/ Dextrose 500 ml @ 0 mls/hr CONT  PRN IV SEE I/O RECORD;  Start 

12/1/17 at 17:45;  Stop 12/3/17 at 14:06;  Status DC


Tramadol HCl (Ultram) 50 mg PRN Q8HRS  PRN PO PAIN MILD TO MOD Last 

administered on 12/3/17at 11:55;  Start 12/1/17 at 19:30


Fentanyl Citrate (Fentanyl 2ml Vial) 50 mcg PRN Q2HR  PRN IV PAIN SEVERE Last 

administered on 12/3/17at 04:04;  Start 12/1/17 at 19:30


Amlodipine Besylate (Norvasc) 5 mg DAILY PO  Last administered on 12/2/17at 09:

00;  Start 12/2/17 at 09:00;  Stop 12/2/17 at 13:15;  Status DC


Aripiprazole (Abilify) 2 mg DAILY PO  Last administered on 12/2/17at 12:30;  

Start 12/2/17 at 09:00;  Stop 12/2/17 at 13:15;  Status DC


Aspirin (Children'S Aspirin) 81 mg DAILY08 PO  Last administered on 12/3/17at 08

:33;  Start 12/2/17 at 08:00


Atenolol (Tenormin) 25 mg DAILY PO  Last administered on 12/3/17at 08:35;  

Start 12/2/17 at 09:00


Digoxin (Lanoxin) 250 mcg DAILY PO  Last administered on 12/3/17at 08:35;  

Start 12/2/17 at 09:00


Diltiazem HCl (Cardizem 24hr Cd) 180 mg DAILY PO ;  Start 12/2/17 at 09:00;  

Stop 12/2/17 at 13:15;  Status DC


Furosemide (Lasix) 40 mg DAILY PO  Last administered on 12/3/17at 08:33;  Start 

12/2/17 at 09:00


Insulin Detemir (Levemir) 80 units HS SQ  Last administered on 12/2/17at 20:47;

  Start 12/1/17 at 21:00


Lisinopril (Prinivil) 40 mg DAILY PO  Last administered on 12/3/17at 08:36;  

Start 12/2/17 at 09:00


Metformin HCl (Glucophage) 500 mg TIDWMEALS PO ;  Start 12/2/17 at 08:00;  Stop 

12/2/17 at 08:00;  Status DC


Nitroglycerin (Nitrostat) 0.4 mg PRN Q5MIN  PRN SL CHEST PAIN;  Start 12/1/17 

at 20:00


Sertraline HCl (Zoloft) 50 mg DAILY PO  Last administered on 12/3/17at 08:36;  

Start 12/2/17 at 09:00


Spironolactone (Aldactone) 25 mg DAILY PO ;  Start 12/2/17 at 09:00;  Stop 12/3/

17 at 08:42;  Status DC


Albuterol Sulfate (Ventolin Neb Soln) 2.5 mg PRN TID  PRN NEB SOA;  Start 12/1/ 17 at 20:30


Alprazolam (Xanax) 0.5 mg PRN Q8HRS  PRN PO ANXIETY / AGITATION Last 

administered on 12/2/17at 20:35;  Start 12/1/17 at 20:15


Non-Formulary Medication 10.2 gm BID IH ;  Start 12/1/17 at 21:00;  Status UNV


Albuterol Sulfate (Ventolin Neb Soln) 2.5 mg RTQID NEB  Last administered on 12/

3/17at 06:59;  Start 12/2/17 at 08:00


Gabapentin (Neurontin) 300 mg DAILY PO  Last administered on 12/3/17at 08:33;  

Start 12/2/17 at 09:00


Pantoprazole Sodium (Protonix) 40 mg DAILYAC PO  Last administered on 12/3/17at 

08:36;  Start 12/2/17 at 07:30


Atorvastatin Calcium (Lipitor) 5 mg QHS PO  Last administered on 12/2/17at 20:35

;  Start 12/1/17 at 21:00


Tamsulosin HCl (Flomax) 0.4 mg DAILY PO  Last administered on 12/3/17at 08:33;  

Start 12/2/17 at 09:00


Budesonide (Pulmicort) 0.5 mg RTBID NEB  Last administered on 12/3/17at 07:00;  

Start 12/2/17 at 08:00


Aspirin (Indigo Aspirin) 325 mg 1X  ONCE PO  Last administered on 12/1/17at 21:24

;  Start 12/1/17 at 21:30;  Stop 12/1/17 at 21:31;  Status DC


Amlodipine Besylate (Norvasc) 10 mg DAILY PO  Last administered on 12/3/17at 08:

36;  Start 12/3/17 at 09:00


Aripiprazole (Abilify) 2 mg QHS PO ;  Start 12/3/17 at 21:00


Amlodipine Besylate (Norvasc) 5 mg 1X  ONCE PO  Last administered on 12/2/17at 

15:33;  Start 12/2/17 at 13:30;  Stop 12/2/17 at 13:31;  Status DC





Active Scripts


Active


Abilify (Aripiprazole) 2 Mg Tablet 2 Mg PO DAILY


Reported


Levemir Flextouch (Insulin Detemir) 100 Unit/1 Ml Insuln.pen 80 Unit SQ HS


Advair 250-50 Diskus (Fluticasone/Salmeterol) 1 Each Disk.w.dev 1 Puff IH BID


Atenolol 25 Mg Tablet 1 Tab PO DAILY


Spironolactone 25 Mg Tablet 1 Tab PO DAILY


Gabapentin 300 Mg Capsule 300 Mg PO DAILY


Amlodipine Besylate 5 Mg Tablet 5 Mg PO DAILY


Pravastatin Sodium 20 Mg Tablet 1 Tab PO DAILY


Omeprazole 20 Mg Capsule.dr 1 Cap PO BID


Zoloft (Sertraline Hcl) 50 Mg Tablet 1 Tab PO DAILY


Furosemide 40 Mg Tablet 1 Tab PO DAILY


Cardizem Cd (Diltiazem Hcl) 180 Mg Cap.er.24h 180 Mg PO DAILY


Digoxin 250 Mcg Tablet 250 Mcg PO DAILY


Proair Hfa Inhaler (Albuterol Sulfate) 8.5 Gm Hfa.aer.ad 8.5 Gm IH PRN TID PRN


Symbicort 160-4.5 Mcg Inhaler (Budesonide/Formoterol Fumarate) 10.2 Gm 

Hfa.aer.ad 10.2 Gm IH BID


NITROGLYCERIN SubLingual (Nitroglycerin) 0.4 Mg Tab.subl 0.4 Mg SL PRN Q5MIN PRN


Alprazolam 0.5 Mg Tab.rapdis 0.5 Mg PO PRN TID PRN


Indigo Chewable (Aspirin) 81 Mg Tab.chew 81 Mg PO DAILY


Metformin Hcl 500 Mg Tablet 500 Mg PO TID


Rapaflo (Silodosin) 8 Mg Capsule 8 Mg PO DAILY


Lisinopril 40 Mg Tablet 40 Mg PO DAILY


Vitals/I & O





Vital Sign - Last 24 Hours








 12/2/17 12/2/17 12/2/17 12/2/17





 15:00 15:33 15:54 19:00


 


Temp 98.2   98.4





 98.2   98.4


 


Pulse 56 82  60


 


Resp 18   18


 


B/P (MAP) 117/69 (85) 132/64  147/85 (105)


 


Pulse Ox 96   93


 


O2 Delivery Room Air  Room Air Room Air


 


    





    





 12/2/17 12/2/17 12/2/17 12/2/17





 20:10 20:36 20:40 20:41


 


Resp  20  


 


Pulse Ox  96 96 96


 


O2 Delivery Room Air Room Air Room Air Room Air





 12/2/17 12/3/17 12/3/17 12/3/17





 23:00 03:00 04:04 04:34


 


Temp 98.6 98.4  





 98.6 98.4  


 


Pulse 58 69  


 


Resp 18 18 18 18


 


B/P (MAP) 124/59 (80) 114/61 (78)  


 


Pulse Ox 95 96 95 95


 


O2 Delivery Room Air Room Air Room Air Room Air


 


    





    





 12/3/17 12/3/17 12/3/17 12/3/17





 07:01 07:30 08:00 08:35


 


Temp  97.8  





  97.8  


 


Pulse  52  52


 


Resp  18  


 


B/P (MAP)  126/70 (88)  126/70


 


Pulse Ox 96 96  


 


O2 Delivery Room Air Room Air Room Air 


 


    





    





 12/3/17 12/3/17 12/3/17 12/3/17





 08:35 08:36 08:36 11:00


 


Temp    97.4





    97.4


 


Pulse 52 52 52 67


 


Resp    18


 


B/P (MAP) 126/70 126/70 126/70 105/65 (78)


 


Pulse Ox    100


 


O2 Delivery    Room Air


 


    





    





 12/3/17   





 11:55   


 


Resp 20   


 


O2 Delivery Room Air   














Intake and Output   


 


 12/2/17 12/2/17 12/3/17





 15:00 23:00 07:00


 


Intake Total  1230 ml 200 ml


 


Balance  1230 ml 200 ml

















MARIAM LEONARD MD Dec 3, 2017 14:10

## 2017-12-03 NOTE — PDOC
SUBJECTIVE


Subjective


feels ok , foot without change





OBJECTIVE


Vital Signs





Vital Signs








  Date Time  Temp Pulse Resp B/P (MAP) Pulse Ox O2 Delivery O2 Flow Rate FiO2


 


12/3/17 11:55   20   Room Air  


 


12/3/17 11:00 97.4 67 18 105/65 (78) 100 Room Air  





 97.4       


 


12/3/17 08:36  52  126/70    


 


12/3/17 08:36  52  126/70    


 


12/3/17 08:35  52  126/70    


 


12/3/17 08:35  52  126/70    


 


12/3/17 08:00      Room Air  


 


12/3/17 07:30 97.8 52 18 126/70 (88) 96 Room Air  





 97.8       


 


12/3/17 07:01     96 Room Air  


 


12/3/17 04:34   18  95 Room Air  


 


12/3/17 04:04   18  95 Room Air  


 


12/3/17 03:00 98.4 69 18 114/61 (78) 96 Room Air  





 98.4       


 


12/2/17 23:00 98.6 58 18 124/59 (80) 95 Room Air  





 98.6       


 


12/2/17 20:41     96 Room Air  


 


12/2/17 20:40     96 Room Air  


 


12/2/17 20:36   20  96 Room Air  


 


12/2/17 20:10      Room Air  


 


12/2/17 19:00 98.4 60 18 147/85 (105) 93 Room Air  





 98.4       


 


12/2/17 15:54      Room Air  


 


12/2/17 15:33  82  132/64    


 


12/2/17 15:00 98.2 56 18 117/69 (85) 96 Room Air  





 98.2       








I & O











Intake and Output 


 


 12/3/17





 07:00


 


Intake Total 1430 ml


 


Balance 1430 ml


 


 


 


Intake Oral 1430 ml


 


# Voids 4











PHYSICAL EXAM


Physical Exam


no change in foot exam





ASSESSMENT/PLAN


Assessment/Plan


1-  RLE limb ischemia


2-CAD/A fib by hx/ CHF/ CABG/


3-DM II


4-HLD


5-HTN


6-COPD


7- X- Smoker


continue plans , for arteriogrm tomorrow


Problems:  





COMMENT


Lab





Laboratory Tests








Test


  12/2/17


17:28 12/2/17


20:38 12/3/17


07:36 12/3/17


11:45


 


Glucose (Fingerstick)


  111 mg/dL


(70-99) 128 mg/dL


(70-99) 86 mg/dL


(70-99) 187 mg/dL


(70-99)

















SIMONA SAMANIEGO MD Dec 3, 2017 14:46

## 2017-12-04 VITALS — DIASTOLIC BLOOD PRESSURE: 70 MMHG | SYSTOLIC BLOOD PRESSURE: 157 MMHG

## 2017-12-04 VITALS — DIASTOLIC BLOOD PRESSURE: 55 MMHG | SYSTOLIC BLOOD PRESSURE: 116 MMHG

## 2017-12-04 VITALS — SYSTOLIC BLOOD PRESSURE: 157 MMHG | DIASTOLIC BLOOD PRESSURE: 70 MMHG

## 2017-12-04 VITALS — DIASTOLIC BLOOD PRESSURE: 65 MMHG | SYSTOLIC BLOOD PRESSURE: 142 MMHG

## 2017-12-04 VITALS — SYSTOLIC BLOOD PRESSURE: 120 MMHG | DIASTOLIC BLOOD PRESSURE: 60 MMHG

## 2017-12-04 VITALS — DIASTOLIC BLOOD PRESSURE: 62 MMHG | SYSTOLIC BLOOD PRESSURE: 134 MMHG

## 2017-12-04 VITALS — DIASTOLIC BLOOD PRESSURE: 67 MMHG | SYSTOLIC BLOOD PRESSURE: 119 MMHG

## 2017-12-04 PROCEDURE — B41D1ZZ FLUOROSCOPY OF AORTA AND BILATERAL LOWER EXTREMITY ARTERIES USING LOW OSMOLAR CONTRAST: ICD-10-PCS | Performed by: RADIOLOGY

## 2017-12-04 RX ADMIN — ALBUTEROL SULFATE SCH MG: 108 AEROSOL, METERED RESPIRATORY (INHALATION) at 19:57

## 2017-12-04 RX ADMIN — ALBUTEROL SULFATE SCH MG: 108 AEROSOL, METERED RESPIRATORY (INHALATION) at 12:00

## 2017-12-04 RX ADMIN — FENTANYL CITRATE PRN MCG: 50 INJECTION INTRAMUSCULAR; INTRAVENOUS at 22:30

## 2017-12-04 RX ADMIN — SERTRALINE HYDROCHLORIDE SCH MG: 50 TABLET ORAL at 12:59

## 2017-12-04 RX ADMIN — DIGOXIN SCH MCG: 250 TABLET ORAL at 13:01

## 2017-12-04 RX ADMIN — ATORVASTATIN CALCIUM SCH MG: 10 TABLET, FILM COATED ORAL at 21:02

## 2017-12-04 RX ADMIN — TAMSULOSIN HYDROCHLORIDE SCH MG: 0.4 CAPSULE ORAL at 13:02

## 2017-12-04 RX ADMIN — ALBUTEROL SULFATE SCH MG: 108 AEROSOL, METERED RESPIRATORY (INHALATION) at 07:16

## 2017-12-04 RX ADMIN — BUDESONIDE SCH MG: 0.5 INHALANT RESPIRATORY (INHALATION) at 07:16

## 2017-12-04 RX ADMIN — FUROSEMIDE SCH MG: 40 TABLET ORAL at 13:01

## 2017-12-04 RX ADMIN — FENTANYL CITRATE PRN MCG: 50 INJECTION INTRAMUSCULAR; INTRAVENOUS at 12:25

## 2017-12-04 RX ADMIN — ATENOLOL SCH MG: 50 TABLET ORAL at 13:00

## 2017-12-04 RX ADMIN — PANTOPRAZOLE SODIUM SCH MG: 40 TABLET, DELAYED RELEASE ORAL at 13:02

## 2017-12-04 RX ADMIN — ASPIRIN 81 MG SCH MG: 81 TABLET ORAL at 13:01

## 2017-12-04 RX ADMIN — BUDESONIDE SCH MG: 0.5 INHALANT RESPIRATORY (INHALATION) at 19:57

## 2017-12-04 RX ADMIN — INSULIN DETEMIR SCH UNITS: 100 INJECTION, SOLUTION SUBCUTANEOUS at 21:06

## 2017-12-04 RX ADMIN — GABAPENTIN SCH MG: 300 CAPSULE ORAL at 13:02

## 2017-12-04 RX ADMIN — LISINOPRIL SCH MG: 40 TABLET ORAL at 13:01

## 2017-12-04 RX ADMIN — ALBUTEROL SULFATE SCH MG: 108 AEROSOL, METERED RESPIRATORY (INHALATION) at 15:48

## 2017-12-04 NOTE — RAD
12/4/2017



1. Abdominal aortogram

2. Bilateral pelvic angiography

3. Right lower extremity angiography



Discussion: Patient is a 67-year-old male with clinical evidence of chronic

limb ischemia involving the bilateral lower extremities right greater than

left. The patient has rest pain on the right. Ultrasound evaluation

demonstrated monophasic through throughout the right lower extremity. The

risks and benefits of the procedure were discussed the patient. Informed

consent was obtained. The patient was brought to the fluoroscopy suite and

placed in the supine position.  A Timeout procedure was performed. 



The left groin was prepped and draped using maximum sterile barrier technique.

1% lidocaine was used for local anesthesia. Using ultrasound and fluoroscopic

guidance the left common femoral artery was accessed using micropuncture

technique. Reference ultrasound images were saved to the medical record. A 5

[place] vascular sheath was placed. Omni Flush catheter was advanced into the

abdominal aorta. Abdominal aortogram and bilateral oblique pelvic angiograms

were obtained demonstrating no hemodynamically significant aortic or iliac

arterial stenoses. Moderate stenosis involving the anterior division of the

right internal iliac artery noted, likely of little clinical significance.

Left common femoral artery is patent. Left common femoral puncture site is

noted in the mid artery.



 There is  occlusion of the distal right common femoral artery with densely

calcified plaque which extends into the origin of the profunda artery. The

origin of the SFA is non visualized. There is reconstitution of the

superficial femoral artery on the right approximately 3 cm inferior to the

femoral head. Multifocal mild narrowings throughout the right SFA are noted

without high-grade stenosis. The popliteal artery is patent on the right.



 There is three-vessel runoff to the mid leg. The peroneal artery is not

visualized approximately 15-20cm  above the ankle. The anterior tibial artery

and posterior tibial artery appear patent. Evaluation of the foot is somewhat

limited secondary to a severe inflow disease.



A Mynx device was deployed to achieve hemostasis at the left common femoral

puncture site. A sterile dressing was applied. No immediate complications were

identified.



FLUORO TIME: 4.2 MIN

DOSE: 565 Gycm2



The procedure was performed under conscious sedation including continuous

cardiopulmonary monitoring via a dedicated sedation nurse. Sedation time: 45

minutes





Impression:



1. Densely calcified occlusion of the distal right common femoral artery

extending the proximal profunda artery. The proximal superficial femoral

artery on the right is chronically occluded. The origin of the right SFA is

nonvisualized. The SFA reconstitutes approximately 3 cm inferior to the right

femoral head.  



 2. No significant iliac artery stenosis



3. Mild narrowings the more distal right SFA, likely not hemodynamically

significant. Two-vessel runoff to the right foot.

## 2017-12-04 NOTE — PDOC
SUBJECTIVE


Subjective


Arteriogram today. Still having significant pain in the RLE, was better 

controlled with tramadol 100mg PRN. No other concerns.





OBJECTIVE


Objective


Reviewed.


Vital Signs





Vital Signs








  Date Time  Temp Pulse Resp B/P (MAP) Pulse Ox O2 Delivery O2 Flow Rate FiO2


 


12/4/17 07:16     98 Room Air  


 


12/4/17 03:10 97.6 61 20 119/67 (84) 94 Room Air  





 97.6       


 


12/3/17 23:10 97.5 60 20 137/73 (94) 94 Room Air  





 97.5       


 


12/3/17 21:35   18  94 Room Air  


 


12/3/17 20:35   20  92 Room Air  


 


12/3/17 20:20      Room Air  


 


12/3/17 19:35 97.7 69 20 126/68 (87) 92 Room Air  





 97.7       


 


12/3/17 18:16      Room Air  


 


12/3/17 15:30 97.4 68 20 92/74 (80) 99 Room Air  





 97.4       


 


12/3/17 11:55   20   Room Air  


 


12/3/17 11:00 97.4 67 18 105/65 (78) 100 Room Air  





 97.4       


 


12/3/17 08:36  52  126/70    


 


12/3/17 08:36  52  126/70    








I & O











Intake and Output 


 


 12/4/17





 06:59


 


Intake Total 1320 ml


 


Balance 1320 ml


 


 


 


Intake Oral 1320 ml


 


# Voids 4











PHYSICAL EXAM


Physical Exam


Alert, oriented


RRR, no murmur


CTAB


Legs in boots





ASSESSMENT/PLAN


Assessment/Plan


RLE limb ischemia


CAD/A fib by hx/ CHF/ CABG/


DM II


HLD


HTN


COPD


Former Smoker





Arteriogram today.


Problems:  





COMMENT


Lab





Laboratory Tests








Test


  12/3/17


11:45 12/3/17


16:52 12/3/17


20:33


 


Glucose (Fingerstick)


  187 mg/dL


(70-99) 167 mg/dL


(70-99) 200 mg/dL


(70-99)

















COURTNEY MARIE MD Dec 4, 2017 08:44

## 2017-12-04 NOTE — PDOC
PROGRESS NOTES


Subjective


Subjective


No acute events overnight. Patient with no new cardiac complaints. Continues to 

have R leg and R foot pain due to PVD. Patient scheduled for arteriogram this 

AM.





Objective


Objective





Vital Signs








  Date Time  Temp Pulse Resp B/P (MAP) Pulse Ox O2 Delivery O2 Flow Rate FiO2


 


12/4/17 07:16     98 Room Air  


 


12/4/17 03:10 97.6 61 20 119/67 (84)    





 97.6       














Intake and Output 


 


 12/4/17





 07:00


 


Intake Total 1320 ml


 


Balance 1320 ml


 


 


 


Intake Oral 1320 ml


 


# Voids 4











Physical Exam


COMMENT


Alert, Awake, Oriented


In No Acute Distress


Lungs- slight wheezing in lung fields b/l


No changes in cardiac exam. RRR w/o murmur


Ischemic changes in LE b/l with R>L. Feet in booties





Assessment


Assessment


Problems


Medical Problems:


(1) Acute renal failure


Status: Acute  





(2) Cellulitis


Status: Acute  





(3) Hyperglycemia


Status: Acute  





(4) PVD (peripheral vascular disease)


Status: Acute  











Plan


Plan of Care


Patient is compensated cardiac-wise. Still with much pain and vascular disease 

to R LE. Patient scheduled for an arteriogram w/ runoff this AM. Patient with 

much arterial blockage on arteriogram, will wait for formal report. Dr. Ascencio to see patient and decide about surgical management for vascular 

disease. No additional changes cardiac wise.





Comment


Review of Relevant


I have reviewed the following items angi (where applicable) has been applied.


Labs





Laboratory Tests








Test


  12/2/17


09:16 12/2/17


11:22 12/2/17


17:28 12/2/17


20:38


 


Glucose (Fingerstick)


  75 mg/dL


(70-99) 199 mg/dL


(70-99) 111 mg/dL


(70-99) 128 mg/dL


(70-99)


 


Test


  12/3/17


07:36 12/3/17


11:45 12/3/17


16:52 12/3/17


20:33


 


Glucose (Fingerstick)


  86 mg/dL


(70-99) 187 mg/dL


(70-99) 167 mg/dL


(70-99) 200 mg/dL


(70-99)








Laboratory Tests








Test


  12/3/17


11:45 12/3/17


16:52 12/3/17


20:33


 


Glucose (Fingerstick)


  187 mg/dL


(70-99) 167 mg/dL


(70-99) 200 mg/dL


(70-99)








Medications





Current Medications


Fentanyl Citrate (Fentanyl 2ml Vial) 50 mcg PRN Q15MIN  PRN IV PAIN GREATER 

THAN 3/10 Last administered on 12/2/17at 02:12;  Start 12/1/17 at 13:00;  Stop 

12/2/17 at 02:56;  Status DC


Hydromorphone HCl (Dilaudid) 1 mg PRN Q15MIN  PRN IV/SQ PAIN GREATER THAN 3/10;

  Start 12/1/17 at 15:00;  Stop 12/2/17 at 14:59;  Status DC


Ondansetron HCl (Zofran) 4 mg PRN Q8HRS  PRN IV NAUSEA/VOMITING;  Start 12/1/17 

at 15:30;  Stop 12/2/17 at 15:29;  Status DC


Hydromorphone HCl (Dilaudid) 0.5 mg PRN Q4HRS  PRN IV PAIN;  Start 12/1/17 at 15

:30


Aspirin (Indigo Aspirin) 325 mg 1X  ONCE PO ;  Start 12/1/17 at 17:00;  Stop 12/1 /17 at 17:03;  Status DC


Heparin Sodium (Porcine) (Heparin Sodium) 5,000 unit 1X  ONCE IV ;  Start 12/1/ 17 at 17:45;  Stop 12/1/17 at 17:46;  Status DC


Heparin Sodium/ Dextrose 500 ml @ 0 mls/hr CONT  PRN IV SEE I/O RECORD;  Start 

12/1/17 at 17:45;  Stop 12/3/17 at 14:06;  Status DC


Tramadol HCl (Ultram) 50 mg PRN Q8HRS  PRN PO PAIN MILD TO MOD Last 

administered on 12/3/17at 20:35;  Start 12/1/17 at 19:30;  Stop 12/4/17 at 08:42

;  Status DC


Fentanyl Citrate (Fentanyl 2ml Vial) 50 mcg PRN Q2HR  PRN IV PAIN SEVERE Last 

administered on 12/3/17at 04:04;  Start 12/1/17 at 19:30


Amlodipine Besylate (Norvasc) 5 mg DAILY PO  Last administered on 12/2/17at 09:

00;  Start 12/2/17 at 09:00;  Stop 12/2/17 at 13:15;  Status DC


Aripiprazole (Abilify) 2 mg DAILY PO  Last administered on 12/2/17at 12:30;  

Start 12/2/17 at 09:00;  Stop 12/2/17 at 13:15;  Status DC


Aspirin (Children'S Aspirin) 81 mg DAILY08 PO  Last administered on 12/3/17at 08

:33;  Start 12/2/17 at 08:00


Atenolol (Tenormin) 25 mg DAILY PO  Last administered on 12/3/17at 08:35;  

Start 12/2/17 at 09:00


Digoxin (Lanoxin) 250 mcg DAILY PO  Last administered on 12/3/17at 08:35;  

Start 12/2/17 at 09:00


Diltiazem HCl (Cardizem 24hr Cd) 180 mg DAILY PO ;  Start 12/2/17 at 09:00;  

Stop 12/2/17 at 13:15;  Status DC


Furosemide (Lasix) 40 mg DAILY PO  Last administered on 12/3/17at 08:33;  Start 

12/2/17 at 09:00


Insulin Detemir (Levemir) 80 units HS SQ  Last administered on 12/3/17at 20:43;

  Start 12/1/17 at 21:00


Lisinopril (Prinivil) 40 mg DAILY PO  Last administered on 12/3/17at 08:36;  

Start 12/2/17 at 09:00


Metformin HCl (Glucophage) 500 mg TIDWMEALS PO ;  Start 12/2/17 at 08:00;  Stop 

12/2/17 at 08:00;  Status DC


Nitroglycerin (Nitrostat) 0.4 mg PRN Q5MIN  PRN SL CHEST PAIN;  Start 12/1/17 

at 20:00


Sertraline HCl (Zoloft) 50 mg DAILY PO  Last administered on 12/3/17at 08:36;  

Start 12/2/17 at 09:00


Spironolactone (Aldactone) 25 mg DAILY PO ;  Start 12/2/17 at 09:00;  Stop 12/3/

17 at 08:42;  Status DC


Albuterol Sulfate (Ventolin Neb Soln) 2.5 mg PRN TID  PRN NEB SOA;  Start 12/1/ 17 at 20:30


Alprazolam (Xanax) 0.5 mg PRN Q8HRS  PRN PO ANXIETY / AGITATION Last 

administered on 12/3/17at 20:35;  Start 12/1/17 at 20:15


Non-Formulary Medication 10.2 gm BID IH ;  Start 12/1/17 at 21:00;  Status UNV


Albuterol Sulfate (Ventolin Neb Soln) 2.5 mg RTQID NEB  Last administered on 12/ 4/17at 07:16;  Start 12/2/17 at 08:00


Gabapentin (Neurontin) 300 mg DAILY PO  Last administered on 12/3/17at 08:33;  

Start 12/2/17 at 09:00


Pantoprazole Sodium (Protonix) 40 mg DAILYAC PO  Last administered on 12/3/17at 

08:36;  Start 12/2/17 at 07:30


Atorvastatin Calcium (Lipitor) 5 mg QHS PO  Last administered on 12/3/17at 20:35

;  Start 12/1/17 at 21:00


Tamsulosin HCl (Flomax) 0.4 mg DAILY PO  Last administered on 12/3/17at 08:33;  

Start 12/2/17 at 09:00


Budesonide (Pulmicort) 0.5 mg RTBID NEB  Last administered on 12/4/17at 07:16;  

Start 12/2/17 at 08:00


Aspirin (Indigo Aspirin) 325 mg 1X  ONCE PO  Last administered on 12/1/17at 21:24

;  Start 12/1/17 at 21:30;  Stop 12/1/17 at 21:31;  Status DC


Amlodipine Besylate (Norvasc) 10 mg DAILY PO  Last administered on 12/3/17at 08:

36;  Start 12/3/17 at 09:00


Aripiprazole (Abilify) 2 mg QHS PO  Last administered on 12/3/17at 20:34;  

Start 12/3/17 at 21:00


Amlodipine Besylate (Norvasc) 5 mg 1X  ONCE PO  Last administered on 12/2/17at 

15:33;  Start 12/2/17 at 13:30;  Stop 12/2/17 at 13:31;  Status DC


Sodium Chloride 1,000 ml @  125 mls/hr 1X  ONCE IV ;  Start 12/4/17 at 08:30;  

Stop 12/4/17 at 16:29


Tramadol HCl (Ultram) 100 mg PRN Q8HRS  PRN PO PAIN MILD TO MOD;  Start 12/4/17 

at 08:45





Active Scripts


Active


Abilify (Aripiprazole) 2 Mg Tablet 2 Mg PO DAILY


Reported


Levemir Flextouch (Insulin Detemir) 100 Unit/1 Ml Insuln.pen 80 Unit SQ HS


Advair 250-50 Diskus (Fluticasone/Salmeterol) 1 Each Disk.w.dev 1 Puff IH BID


Atenolol 25 Mg Tablet 1 Tab PO DAILY


Spironolactone 25 Mg Tablet 1 Tab PO DAILY


Gabapentin 300 Mg Capsule 300 Mg PO DAILY


Amlodipine Besylate 5 Mg Tablet 5 Mg PO DAILY


Pravastatin Sodium 20 Mg Tablet 1 Tab PO DAILY


Omeprazole 20 Mg Capsule.dr 1 Cap PO BID


Zoloft (Sertraline Hcl) 50 Mg Tablet 1 Tab PO DAILY


Furosemide 40 Mg Tablet 1 Tab PO DAILY


Cardizem Cd (Diltiazem Hcl) 180 Mg Cap.er.24h 180 Mg PO DAILY


Digoxin 250 Mcg Tablet 250 Mcg PO DAILY


Proair Hfa Inhaler (Albuterol Sulfate) 8.5 Gm Hfa.aer.ad 8.5 Gm IH PRN TID PRN


Symbicort 160-4.5 Mcg Inhaler (Budesonide/Formoterol Fumarate) 10.2 Gm 

Hfa.aer.ad 10.2 Gm IH BID


NITROGLYCERIN SubLingual (Nitroglycerin) 0.4 Mg Tab.subl 0.4 Mg SL PRN Q5MIN PRN


Alprazolam 0.5 Mg Tab.rapdis 0.5 Mg PO PRN TID PRN


Indigo Chewable (Aspirin) 81 Mg Tab.chew 81 Mg PO DAILY


Metformin Hcl 500 Mg Tablet 500 Mg PO TID


Rapaflo (Silodosin) 8 Mg Capsule 8 Mg PO DAILY


Lisinopril 40 Mg Tablet 40 Mg PO DAILY


Vitals/I & O





Vital Sign - Last 24 Hours








 12/3/17 12/3/17 12/3/17 12/3/17





 11:00 11:55 15:30 18:16


 


Temp 97.4  97.4 





 97.4  97.4 


 


Pulse 67  68 


 


Resp 18 20 20 


 


B/P (MAP) 105/65 (78)  92/74 (80) 


 


Pulse Ox 100  99 


 


O2 Delivery Room Air Room Air Room Air Room Air


 


    





    





 12/3/17 12/3/17 12/3/17 12/3/17





 19:35 20:20 20:35 21:35


 


Temp 97.7   





 97.7   


 


Pulse 69   


 


Resp 20  20 18


 


B/P (MAP) 126/68 (87)   


 


Pulse Ox 92  92 94


 


O2 Delivery Room Air Room Air Room Air Room Air


 


    





    





 12/3/17 12/4/17 12/4/17 





 23:10 03:10 07:16 


 


Temp 97.5 97.6  





 97.5 97.6  


 


Pulse 60 61  


 


Resp 20 20  


 


B/P (MAP) 137/73 (94) 119/67 (84)  


 


Pulse Ox 94 94 98 


 


O2 Delivery Room Air Room Air Room Air 














Intake and Output   


 


 12/3/17 12/3/17 12/4/17





 15:00 23:00 07:00


 


Intake Total 240 ml 720 ml 360 ml


 


Balance 240 ml 720 ml 360 ml

















MARIAM LEONARD MD Dec 4, 2017 08:49

## 2017-12-05 VITALS — SYSTOLIC BLOOD PRESSURE: 149 MMHG | DIASTOLIC BLOOD PRESSURE: 70 MMHG

## 2017-12-05 VITALS — DIASTOLIC BLOOD PRESSURE: 74 MMHG | SYSTOLIC BLOOD PRESSURE: 165 MMHG

## 2017-12-05 VITALS — DIASTOLIC BLOOD PRESSURE: 74 MMHG | SYSTOLIC BLOOD PRESSURE: 168 MMHG

## 2017-12-05 VITALS — SYSTOLIC BLOOD PRESSURE: 144 MMHG | DIASTOLIC BLOOD PRESSURE: 69 MMHG

## 2017-12-05 VITALS — SYSTOLIC BLOOD PRESSURE: 166 MMHG | DIASTOLIC BLOOD PRESSURE: 72 MMHG

## 2017-12-05 VITALS — DIASTOLIC BLOOD PRESSURE: 81 MMHG | SYSTOLIC BLOOD PRESSURE: 147 MMHG

## 2017-12-05 VITALS — DIASTOLIC BLOOD PRESSURE: 66 MMHG | SYSTOLIC BLOOD PRESSURE: 158 MMHG

## 2017-12-05 VITALS — SYSTOLIC BLOOD PRESSURE: 167 MMHG | DIASTOLIC BLOOD PRESSURE: 79 MMHG

## 2017-12-05 RX ADMIN — SERTRALINE HYDROCHLORIDE SCH MG: 50 TABLET ORAL at 07:47

## 2017-12-05 RX ADMIN — INSULIN DETEMIR SCH UNITS: 100 INJECTION, SOLUTION SUBCUTANEOUS at 21:49

## 2017-12-05 RX ADMIN — ALBUTEROL SULFATE SCH MG: 108 AEROSOL, METERED RESPIRATORY (INHALATION) at 11:50

## 2017-12-05 RX ADMIN — FUROSEMIDE SCH MG: 40 TABLET ORAL at 09:00

## 2017-12-05 RX ADMIN — LIDOCAINE HYDROCHLORIDE ONE: 10 INJECTION, SOLUTION EPIDURAL; INFILTRATION; INTRACAUDAL; PERINEURAL at 11:44

## 2017-12-05 RX ADMIN — BUDESONIDE SCH MG: 0.5 INHALANT RESPIRATORY (INHALATION) at 19:53

## 2017-12-05 RX ADMIN — TAMSULOSIN HYDROCHLORIDE SCH MG: 0.4 CAPSULE ORAL at 07:48

## 2017-12-05 RX ADMIN — BUDESONIDE SCH MG: 0.5 INHALANT RESPIRATORY (INHALATION) at 07:32

## 2017-12-05 RX ADMIN — ALBUTEROL SULFATE SCH MG: 108 AEROSOL, METERED RESPIRATORY (INHALATION) at 19:53

## 2017-12-05 RX ADMIN — DIGOXIN SCH MCG: 250 TABLET ORAL at 09:00

## 2017-12-05 RX ADMIN — ATENOLOL SCH MG: 50 TABLET ORAL at 07:48

## 2017-12-05 RX ADMIN — ALBUTEROL SULFATE SCH MG: 108 AEROSOL, METERED RESPIRATORY (INHALATION) at 16:00

## 2017-12-05 RX ADMIN — HYDROMORPHONE HYDROCHLORIDE SCH MG: 2 INJECTION INTRAMUSCULAR; INTRAVENOUS; SUBCUTANEOUS at 17:06

## 2017-12-05 RX ADMIN — LISINOPRIL SCH MG: 40 TABLET ORAL at 07:47

## 2017-12-05 RX ADMIN — ALBUTEROL SULFATE SCH MG: 108 AEROSOL, METERED RESPIRATORY (INHALATION) at 07:33

## 2017-12-05 RX ADMIN — HYDROMORPHONE HYDROCHLORIDE SCH MG: 2 INJECTION INTRAMUSCULAR; INTRAVENOUS; SUBCUTANEOUS at 16:41

## 2017-12-05 RX ADMIN — PANTOPRAZOLE SODIUM SCH MG: 40 TABLET, DELAYED RELEASE ORAL at 07:48

## 2017-12-05 RX ADMIN — LIDOCAINE HYDROCHLORIDE ONE: 10 INJECTION, SOLUTION EPIDURAL; INFILTRATION; INTRACAUDAL; PERINEURAL at 12:49

## 2017-12-05 RX ADMIN — ATORVASTATIN CALCIUM SCH MG: 10 TABLET, FILM COATED ORAL at 21:45

## 2017-12-05 RX ADMIN — ASPIRIN 81 MG SCH MG: 81 TABLET ORAL at 07:47

## 2017-12-05 RX ADMIN — GABAPENTIN SCH MG: 300 CAPSULE ORAL at 07:48

## 2017-12-05 RX ADMIN — HYDROMORPHONE HYDROCHLORIDE SCH MG: 2 INJECTION INTRAMUSCULAR; INTRAVENOUS; SUBCUTANEOUS at 16:51

## 2017-12-05 NOTE — OP
DATE OF SURGERY:  12/05/2017



PREOPERATIVE DIAGNOSIS:  Ischemic rest pain, right leg secondary to common

femoral, profunda femoris, and proximal superficial femoral artery occlusion.



POSTOPERATIVE DIAGNOSIS:  Ischemic rest pain, right leg secondary to common

femoral, profunda femoris, and proximal superficial femoral artery occlusion.



PROCEDURE PERFORMED:  Right common femoral, profunda femoris, and superficial

femoral endarterectomy.  Advancement angioplasty of the common femoral

bifurcation and bovine pericardial patch angioplasty of the arterial closure.



INDICATIONS:  This is a 67-year-old male with severe peripheral vascular

disease, presented with ischemic rest pain of his right foot.  Arteriographic

evaluation showed a relatively normal aortoiliac tree except on the right side

where the common femoral artery is nearly completely occluded.  There was severe

plaque extending into the profunda femoris artery and there was occlusion of the

first 4-5 cm of the superficial femoral artery.  Recommendation was for arterial

reconstruction.



FINDINGS:  Severe calcific plaque present in the common femoral artery extending

into the profunda femoris and for 4 cm in the superficial femoral artery. 

Excellent pulse in the distal external iliac artery.



DESCRIPTION OF PROCEDURE:  General anesthetic was administered.  Rhodes catheter

was placed in the urinary bladder under sterile condition.  He was prepped and

draped in a sterile fashion.  Preoperative antibiotics were administered.  A

longitudinal incision was made over the common femoral artery on the right. 

Soft tissues were divided with electrocautery until the common femoral artery

could be identified.  This was isolated as was the superficial femoral artery

for a distance of about 5 cm.  The profunda femoris artery was dissected down to

its second set of bifurcating vessels.  The inguinal ligament was elevated and

the distal external iliac artery was isolated as were the medial and lateral

circumflex iliac vessels.



The patient received 5000 units of intravenous heparin.  Vessel loops were

secured after 3 minutes heparin circulation and the distal external iliac artery

was occluded with a Satinsky clamp.  A longitudinal arteriotomy was placed in

the profunda femoris artery immediately on this vessel and laterally on the

superficial femoral artery.



The arteriotomy in the superficial femoral artery had to be extended a little

further distally to get beyond the severe plaque in this vessel.  At that point,

the artery had only mild amount of plaque and there was an excellent transition

point.



Plaque was elevated with a Penfield dissector, retrograde endarterectomy was

accomplished with a Penfield dissector and the plaque was removed.



Plaque was then teased out of the profunda femoris artery, transected distally

with an excellent endpoint and also in the superficial femoral artery.  The

medial wall of the profunda femoris artery and lateral wall of the superficial

femoral artery were then reapproximated posteriorly with a running suture of 6-0

Prolene.  This was secured at the endpoint of the arteriotomy of the profunda

femoris artery.  A bovine pericardial patch was then brought onto the field and

trimmed.  It was sewn in place with a running 6-0 Prolene suture.  Once the

suture line was nearly completed, the femoral artery was flushed.  Distal

vessels were backbled.  Suture line was completed.  Blood flow was reinstituted

into the side branches of the profunda femoris artery and then finally to the

distal profunda femoris artery and superficial femoral artery.  Excellent

Doppler flow signals were present in these vessels following the arterial

reconstruction.  The wound was then infiltrated with 25 mL of 0.5% Marcaine.



The wound was then closed in layers with 2-0 Vicryl in deep and superficial

subcutaneous tissue.  [place] approximated the wound margins.  A Prevena

dressing was placed and the patient was moved from the operating room to

recovery in satisfactory stable condition.

 



______________________________

AMANDA VALADEZ MD



DR:  [email or web address]  JOB#:  [id number] / [id number]

DD:  12/05/2017 14:52  DT:  12/05/2017 15:14

## 2017-12-05 NOTE — PDOC
PROGRESS NOTES


Subjective


Subjective


No acute events overnight. R LE Arteriogram yesterday with chronic occlusion 

findings. Patient scheduled for LE vascular bypass surgery today.





Objective


Objective





Vital Signs








  Date Time  Temp Pulse Resp B/P (MAP) Pulse Ox O2 Delivery O2 Flow Rate FiO2


 


12/5/17 10:23 97.7 50 18 139/65 93 Room Air  





 97.7       


 


12/4/17 14:00       2.0 














Intake and Output 


 


 12/5/17





 07:00


 


Intake Total 950 ml


 


Output Total 300 ml


 


Balance 650 ml


 


 


 


Intake Oral 950 ml


 


Output Urine Total 300 ml


 


# Voids 1











Physical Exam


COMMENT


Alert, Awake, Oriented


In No Acute Distress


Lungs- slight wheezing in lung fields b/l


No changes in cardiac exam. RRR w/o murmur


Ischemic changes in LE b/l with R>L. Feet in booties





Assessment


Assessment


Problems


Medical Problems:


(1) Acute renal failure


Status: Acute  





(2) Cellulitis


Status: Acute  





(3) Hyperglycemia


Status: Acute  





(4) PVD (peripheral vascular disease)


Status: Acute  











Plan


Plan of Care


Patient continues to be compensated cardiac-wise. Still with much pain and 

vascular disease to R LE. Arteriogram of R LE with findings of chronic vascular 

occlusion. Patient scheduled for vascular procedure today. Will make no changes 

cardaic wise.





Comment


Review of Relevant


I have reviewed the following items angi (where applicable) has been applied.


Labs





Laboratory Tests








Test


  12/3/17


11:45 12/3/17


16:52 12/3/17


20:33 12/4/17


07:59


 


Glucose (Fingerstick)


  187 mg/dL


(70-99) 167 mg/dL


(70-99) 200 mg/dL


(70-99) 81 mg/dL


(70-99)


 


Test


  12/4/17


11:50 12/4/17


13:12 12/4/17


17:02 12/4/17


20:54


 


Glucose (Fingerstick)


  71 mg/dL


(70-99) 98 mg/dL


(70-99) 209 mg/dL


(70-99) 206 mg/dL


(70-99)


 


Test


  12/5/17


06:54 12/5/17


10:11 


  


 


 


Glucose (Fingerstick)


  84 mg/dL


(70-99) 86 mg/dL


(70-99) 


  


 








Laboratory Tests








Test


  12/4/17


11:50 12/4/17


13:12 12/4/17


17:02 12/4/17


20:54


 


Glucose (Fingerstick)


  71 mg/dL


(70-99) 98 mg/dL


(70-99) 209 mg/dL


(70-99) 206 mg/dL


(70-99)


 


Test


  12/5/17


06:54 12/5/17


10:11 


  


 


 


Glucose (Fingerstick)


  84 mg/dL


(70-99) 86 mg/dL


(70-99) 


  


 








Medications





Current Medications


Fentanyl Citrate (Fentanyl 2ml Vial) 50 mcg PRN Q15MIN  PRN IV PAIN GREATER 

THAN 3/10 Last administered on 12/2/17at 02:12;  Start 12/1/17 at 13:00;  Stop 

12/2/17 at 02:56;  Status DC


Hydromorphone HCl (Dilaudid) 1 mg PRN Q15MIN  PRN IV/SQ PAIN GREATER THAN 3/10;

  Start 12/1/17 at 15:00;  Stop 12/2/17 at 14:59;  Status DC


Ondansetron HCl (Zofran) 4 mg PRN Q8HRS  PRN IV NAUSEA/VOMITING;  Start 12/1/17 

at 15:30;  Stop 12/2/17 at 15:29;  Status DC


Hydromorphone HCl (Dilaudid) 0.5 mg PRN Q4HRS  PRN IV PAIN;  Start 12/1/17 at 15

:30


Aspirin (Indigo Aspirin) 325 mg 1X  ONCE PO ;  Start 12/1/17 at 17:00;  Stop 12/1 /17 at 17:03;  Status DC


Heparin Sodium (Porcine) (Heparin Sodium) 5,000 unit 1X  ONCE IV ;  Start 12/1/ 17 at 17:45;  Stop 12/1/17 at 17:46;  Status DC


Heparin Sodium/ Dextrose 500 ml @ 0 mls/hr CONT  PRN IV SEE I/O RECORD;  Start 

12/1/17 at 17:45;  Stop 12/3/17 at 14:06;  Status DC


Tramadol HCl (Ultram) 50 mg PRN Q8HRS  PRN PO PAIN MILD TO MOD Last 

administered on 12/3/17at 20:35;  Start 12/1/17 at 19:30;  Stop 12/4/17 at 08:42

;  Status DC


Fentanyl Citrate (Fentanyl 2ml Vial) 50 mcg PRN Q2HR  PRN IV PAIN SEVERE Last 

administered on 12/4/17at 22:30;  Start 12/1/17 at 19:30


Amlodipine Besylate (Norvasc) 5 mg DAILY PO  Last administered on 12/2/17at 09:

00;  Start 12/2/17 at 09:00;  Stop 12/2/17 at 13:15;  Status DC


Aripiprazole (Abilify) 2 mg DAILY PO  Last administered on 12/2/17at 12:30;  

Start 12/2/17 at 09:00;  Stop 12/2/17 at 13:15;  Status DC


Aspirin (Children'S Aspirin) 81 mg DAILY08 PO  Last administered on 12/5/17at 07

:47;  Start 12/2/17 at 08:00


Atenolol (Tenormin) 25 mg DAILY PO  Last administered on 12/5/17at 07:48;  

Start 12/2/17 at 09:00


Digoxin (Lanoxin) 250 mcg DAILY PO  Last administered on 12/4/17at 13:01;  

Start 12/2/17 at 09:00


Diltiazem HCl (Cardizem 24hr Cd) 180 mg DAILY PO ;  Start 12/2/17 at 09:00;  

Stop 12/2/17 at 13:15;  Status DC


Furosemide (Lasix) 40 mg DAILY PO  Last administered on 12/4/17at 13:01;  Start 

12/2/17 at 09:00


Insulin Detemir (Levemir) 80 units HS SQ  Last administered on 12/4/17at 21:06;

  Start 12/1/17 at 21:00


Lisinopril (Prinivil) 40 mg DAILY PO  Last administered on 12/5/17at 07:47;  

Start 12/2/17 at 09:00


Metformin HCl (Glucophage) 500 mg TIDWMEALS PO ;  Start 12/2/17 at 08:00;  Stop 

12/2/17 at 08:00;  Status DC


Nitroglycerin (Nitrostat) 0.4 mg PRN Q5MIN  PRN SL CHEST PAIN;  Start 12/1/17 

at 20:00


Sertraline HCl (Zoloft) 50 mg DAILY PO  Last administered on 12/5/17at 07:47;  

Start 12/2/17 at 09:00


Spironolactone (Aldactone) 25 mg DAILY PO ;  Start 12/2/17 at 09:00;  Stop 12/3/

17 at 08:42;  Status DC


Albuterol Sulfate (Ventolin Neb Soln) 2.5 mg PRN TID  PRN NEB SOA;  Start 12/1/ 17 at 20:30


Alprazolam (Xanax) 0.5 mg PRN Q8HRS  PRN PO ANXIETY / AGITATION Last 

administered on 12/3/17at 20:35;  Start 12/1/17 at 20:15


Non-Formulary Medication 10.2 gm BID IH ;  Start 12/1/17 at 21:00;  Status UNV


Albuterol Sulfate (Ventolin Neb Soln) 2.5 mg RTQID NEB  Last administered on 12/ 5/17at 07:33;  Start 12/2/17 at 08:00


Gabapentin (Neurontin) 300 mg DAILY PO  Last administered on 12/5/17at 07:48;  

Start 12/2/17 at 09:00


Pantoprazole Sodium (Protonix) 40 mg DAILYAC PO  Last administered on 12/5/17at 

07:48;  Start 12/2/17 at 07:30


Atorvastatin Calcium (Lipitor) 5 mg QHS PO  Last administered on 12/4/17at 21:02

;  Start 12/1/17 at 21:00


Tamsulosin HCl (Flomax) 0.4 mg DAILY PO  Last administered on 12/5/17at 07:48;  

Start 12/2/17 at 09:00


Budesonide (Pulmicort) 0.5 mg RTBID NEB  Last administered on 12/5/17at 07:32;  

Start 12/2/17 at 08:00


Aspirin (Indigo Aspirin) 325 mg 1X  ONCE PO  Last administered on 12/1/17at 21:24

;  Start 12/1/17 at 21:30;  Stop 12/1/17 at 21:31;  Status DC


Amlodipine Besylate (Norvasc) 10 mg DAILY PO  Last administered on 12/5/17at 07:

48;  Start 12/3/17 at 09:00


Aripiprazole (Abilify) 2 mg QHS PO  Last administered on 12/4/17at 21:02;  

Start 12/3/17 at 21:00


Amlodipine Besylate (Norvasc) 5 mg 1X  ONCE PO  Last administered on 12/2/17at 

15:33;  Start 12/2/17 at 13:30;  Stop 12/2/17 at 13:31;  Status DC


Sodium Chloride 1,000 ml @  125 mls/hr 1X  ONCE IV  Last administered on 12/4/ 17at 08:54;  Start 12/4/17 at 08:30;  Stop 12/4/17 at 16:29;  Status DC


Tramadol HCl (Ultram) 100 mg PRN Q8HRS  PRN PO PAIN MILD TO MOD Last 

administered on 12/4/17at 15:46;  Start 12/4/17 at 08:45


Iodixanol (Visipaque 320) 100 ml STK-MED ONCE .ROUTE ;  Start 12/4/17 at 09:01;

  Stop 12/4/17 at 09:02;  Status DC


Lidocaine/Sodium Bicarbonate (Buffered Lidocaine 1%) 20 ml STK-MED ONCE IJ ;  

Start 12/4/17 at 09:01;  Stop 12/4/17 at 09:02;  Status DC


Heparin Sodium/ Sodium Chloride 1,000 ml @  As Directed STK-MED ONCE .ROUTE ;  

Start 12/4/17 at 09:01;  Stop 12/4/17 at 09:02;  Status DC


Midazolam HCl (Versed) 2 mg STK-MED ONCE .ROUTE ;  Start 12/4/17 at 09:51;  

Stop 12/4/17 at 09:52;  Status DC


Fentanyl Citrate (Fentanyl 2ml Vial) 100 mcg STK-MED ONCE .ROUTE ;  Start 12/4/ 17 at 09:51;  Stop 12/4/17 at 09:52;  Status DC


Heparin Sodium (Porcine) (Heparin Sodium) 10,000 unit STK-MED ONCE .ROUTE ;  

Start 12/4/17 at 09:51;  Stop 12/4/17 at 09:52;  Status DC


Heparin Sodium/ Sodium Chloride 1,000 unit 1X  ONCE IART  Last administered on 

12/4/17at 11:22;  Start 12/4/17 at 11:00;  Stop 12/4/17 at 11:01;  Status DC


Lidocaine/Sodium Bicarbonate (Buffered Lidocaine 1%) 20 ml 1X  ONCE IJ  Last 

administered on 12/4/17at 11:22;  Start 12/4/17 at 11:00;  Stop 12/4/17 at 11:01

;  Status DC


Midazolam HCl (Versed) 2 mg 1X  ONCE IV  Last administered on 12/4/17at 11:23;  

Start 12/4/17 at 11:00;  Stop 12/4/17 at 11:01;  Status DC


Fentanyl Citrate (Fentanyl 2ml Vial) 100 mcg 1X  ONCE IV  Last administered on 

12/4/17at 11:23;  Start 12/4/17 at 11:00;  Stop 12/4/17 at 11:01;  Status DC


Iodixanol (Visipaque 320) 100 ml 1X  ONCE IART  Last administered on 12/4/17at 

11:22;  Start 12/4/17 at 11:00;  Stop 12/4/17 at 11:01;  Status DC


Ondansetron HCl (Zofran) 4 mg PRN Q6HRS  PRN IV NAUSEA/VOMITING;  Start 12/5/17 

at 09:30;  Stop 12/6/17 at 09:29


Fentanyl Citrate (Fentanyl 2ml Vial) 25 mcg PRN Q5MIN  PRN IV MILD PAIN;  Start 

12/5/17 at 09:30;  Stop 12/6/17 at 09:29


Fentanyl Citrate (Fentanyl 2ml Vial) 50 mcg PRN Q5MIN  PRN IV MODERATE PAIN;  

Start 12/5/17 at 09:30;  Stop 12/6/17 at 09:29


Ringer's Solution 1,000 ml @  30 mls/hr Q24H IV ;  Start 12/5/17 at 09:19;  

Stop 12/5/17 at 21:18


Lidocaine HCl (Xylocaine-Mpf 1% Vial) 2 ml 1X PRN  PRN ID IV START;  Start 12/5/ 17 at 09:30;  Stop 12/6/17 at 09:29


Prochlorperazine Edisylate (Compazine) 5 mg PACU PRN  PRN IV NAUSEA, MRX1;  

Start 12/5/17 at 09:30;  Stop 12/6/17 at 09:29


Gelatin (Gelfoam  Size 100) 1 each STK-MED ONCE .ROUTE ;  Start 12/5/17 at 10:09

;  Stop 12/5/17 at 10:10;  Status DC


Iohexol (Omnipaque 300 Mg/ml) 100 ml STK-MED ONCE .ROUTE ;  Start 12/5/17 at 10:

09;  Stop 12/5/17 at 10:10;  Status DC


Cellulose 1 each STK-MED ONCE .ROUTE ;  Start 12/5/17 at 10:09;  Stop 12/5/17 

at 10:10;  Status DC


Papaverine HCl 60 mg STK-MED ONCE .ROUTE ;  Start 12/5/17 at 10:09;  Stop 12/5/ 17 at 10:10;  Status DC


Thrombin 20,000 unit STK-MED ONCE TP ;  Start 12/5/17 at 10:09;  Stop 12/5/17 

at 10:10;  Status DC


Heparin Sodium (Porcine) 5000 unit/Sodium Chloride 505 ml @  505 mls/hr 1X 

PERIOP  ONCE IRR ;  Start 12/5/17 at 10:12;  Stop 12/5/17 at 11:11


Lidocaine HCl 48 ml/Sodium Bicarbonate 12 meq/Miscellaneous 60 ml @ 60 mls/hr 

1X PERIOP  ONCE ID ;  Start 12/5/17 at 11:00;  Stop 12/5/17 at 11:59


Famotidine (Pepcid Vial) 20 mg STK-MED ONCE .ROUTE ;  Start 12/5/17 at 10:24;  

Stop 12/5/17 at 10:25;  Status DC


Rocuronium Bromide (Zemuron) 50 mg STK-MED ONCE .ROUTE ;  Start 12/5/17 at 10:24

;  Stop 12/5/17 at 10:25;  Status DC


Midazolam HCl (Versed) 2 mg STK-MED ONCE .ROUTE ;  Start 12/5/17 at 10:24;  

Stop 12/5/17 at 10:25;  Status DC


Fentanyl Citrate (Fentanyl 2ml Vial) 100 mcg STK-MED ONCE .ROUTE ;  Start 12/5/ 17 at 10:24;  Stop 12/5/17 at 10:25;  Status DC





Active Scripts


Active


Abilify (Aripiprazole) 2 Mg Tablet 2 Mg PO DAILY


Reported


Levemir Flextouch (Insulin Detemir) 100 Unit/1 Ml Insuln.pen 80 Unit SQ HS


Advair 250-50 Diskus (Fluticasone/Salmeterol) 1 Each Disk.w.dev 1 Puff IH BID


Atenolol 25 Mg Tablet 1 Tab PO DAILY


Spironolactone 25 Mg Tablet 1 Tab PO DAILY


Gabapentin 300 Mg Capsule 300 Mg PO DAILY


Amlodipine Besylate 5 Mg Tablet 5 Mg PO DAILY


Pravastatin Sodium 20 Mg Tablet 1 Tab PO DAILY


Omeprazole 20 Mg Capsule.dr 1 Cap PO BID


Zoloft (Sertraline Hcl) 50 Mg Tablet 1 Tab PO DAILY


Furosemide 40 Mg Tablet 1 Tab PO DAILY


Cardizem Cd (Diltiazem Hcl) 180 Mg Cap.er.24h 180 Mg PO DAILY


Digoxin 250 Mcg Tablet 250 Mcg PO DAILY


Proair Hfa Inhaler (Albuterol Sulfate) 8.5 Gm Hfa.aer.ad 8.5 Gm IH PRN TID PRN


Symbicort 160-4.5 Mcg Inhaler (Budesonide/Formoterol Fumarate) 10.2 Gm 

Hfa.aer.ad 10.2 Gm IH BID


NITROGLYCERIN SubLingual (Nitroglycerin) 0.4 Mg Tab.subl 0.4 Mg SL PRN Q5MIN PRN


Alprazolam 0.5 Mg Tab.rapdis 0.5 Mg PO PRN TID PRN


Indigo Chewable (Aspirin) 81 Mg Tab.chew 81 Mg PO DAILY


Metformin Hcl 500 Mg Tablet 500 Mg PO TID


Rapaflo (Silodosin) 8 Mg Capsule 8 Mg PO DAILY


Lisinopril 40 Mg Tablet 40 Mg PO DAILY


Vitals/I & O





Vital Sign - Last 24 Hours








 12/4/17 12/4/17 12/4/17 12/4/17





 11:18 11:23 11:45 12:00


 


Pulse 69  69 69


 


Resp 16 16 16 16


 


Pulse Ox 98 98 98 98


 


O2 Delivery Nasal Cannula Nasal Cannula Nasal Cannula Nasal Cannula


 


O2 Flow Rate 2.0 2.0 2.0 2.0





 12/4/17 12/4/17 12/4/17 12/4/17





 12:15 12:25 12:30 12:45


 


Pulse 69  69 69


 


Resp 16  16 16


 


Pulse Ox 98  98 98


 


O2 Delivery Nasal Cannula Room Air Nasal Cannula Nasal Cannula


 


O2 Flow Rate 2.0  2.0 2.0





 12/4/17 12/4/17 12/4/17 12/4/17





 13:00 13:00 13:01 13:01


 


Pulse 59 69 57 69


 


Resp  16  


 


B/P (MAP) 176/82  176/82 157/70


 


Pulse Ox  98  


 


O2 Delivery  Nasal Cannula  


 


O2 Flow Rate  2.0  





 12/4/17 12/4/17 12/4/17 12/4/17





 13:02 13:30 14:00 15:00


 


Temp    97.6





    97.6


 


Pulse 63 69 69 54


 


Resp  16 16 19


 


B/P (MAP) 176/82   134/62 (86)


 


Pulse Ox  98 98 96


 


O2 Delivery  Nasal Cannula Nasal Cannula Room Air


 


O2 Flow Rate  2.0 2.0 


 


    





    





 12/4/17 12/4/17 12/4/17 12/4/17





 15:46 15:48 18:19 19:00


 


Temp    97.9





    97.9


 


Pulse    61


 


Resp    16


 


B/P (MAP)    116/55 (75)


 


Pulse Ox  98  97


 


O2 Delivery Room Air Room Air Room Air Room Air


 


    





    





 12/4/17 12/4/17 12/4/17 12/4/17





 19:20 19:57 22:30 22:32


 


Temp    97.7





    97.7


 


Pulse    58


 


Resp   16 18


 


B/P (MAP)    142/65 (90)


 


Pulse Ox  95 96 93


 


O2 Delivery Room Air Room Air Room Air Room Air


 


    





    





 12/4/17 12/5/17 12/5/17 12/5/17





 23:05 03:00 07:00 07:33


 


Temp  98.0 97.6 





  98.0 97.6 


 


Pulse  55 67 


 


Resp 18 16 20 


 


B/P (MAP)  147/81 (103) 144/69 (94) 


 


Pulse Ox 96 95 97 95


 


O2 Delivery Room Air Room Air Room Air Room Air


 


    





    





 12/5/17 12/5/17 12/5/17 12/5/17





 07:47 07:48 07:48 08:00


 


Pulse 55 55 55 


 


B/P (MAP) 147/81 147/81 147/81 


 


O2 Delivery    Room Air





 12/5/17   





 10:23   


 


Temp 97.7   





 97.7   


 


Pulse 50   


 


Resp 18   


 


B/P (MAP) 139/65   


 


Pulse Ox 93   


 


O2 Delivery Room Air   














Intake and Output   


 


 12/4/17 12/4/17 12/5/17





 15:00 23:00 07:00


 


Intake Total 500 ml 250 ml 200 ml


 


Output Total   300 ml


 


Balance 500 ml 250 ml -100 ml

















MARIAM LEONARD MD Dec 5, 2017 10:44

## 2017-12-05 NOTE — PDOC
SUBJECTIVE


Subjective


Doing well this AM, though still having pain.





OBJECTIVE


Objective


Arteriogram:





Impression:





1. Densely calcified occlusion of the distal right common femoral artery


extending the proximal profunda artery. The proximal superficial femoral


artery on the right is chronically occluded. The origin of the right SFA is


nonvisualized. The SFA reconstitutes approximately 3 cm inferior to the right


femoral head.  





 2. No significant iliac artery stenosis





3. Mild narrowings the more distal right SFA, likely not hemodynamically


significant. Two-vessel runoff to the right foot.


Vital Signs





Vital Signs








  Date Time  Temp Pulse Resp B/P (MAP) Pulse Ox O2 Delivery O2 Flow Rate FiO2


 


12/5/17 07:48  55  147/81    


 


12/5/17 07:48  55  147/81    


 


12/5/17 07:47  55  147/81    


 


12/5/17 07:33     95 Room Air  


 


12/5/17 03:00 98.0 55 16 147/81 (103) 95 Room Air  





 98.0       


 


12/4/17 23:05   18  96 Room Air  


 


12/4/17 22:32 97.7 58 18 142/65 (90) 93 Room Air  





 97.7       


 


12/4/17 22:30   16  96 Room Air  


 


12/4/17 19:57     95 Room Air  


 


12/4/17 19:20      Room Air  


 


12/4/17 19:00 97.9 61 16 116/55 (75) 97 Room Air  





 97.9       


 


12/4/17 18:19      Room Air  


 


12/4/17 15:48     98 Room Air  


 


12/4/17 15:46      Room Air  


 


12/4/17 15:00 97.6 54 19 134/62 (86) 96 Room Air  





 97.6       


 


12/4/17 14:00  69 16  98 Nasal Cannula 2.0 


 


12/4/17 13:30  69 16  98 Nasal Cannula 2.0 


 


12/4/17 13:02  63  176/82    


 


12/4/17 13:01  69  157/70    


 


12/4/17 13:01  57  176/82    


 


12/4/17 13:00  69 16  98 Nasal Cannula 2.0 


 


12/4/17 13:00  59  176/82    


 


12/4/17 12:45  69 16  98 Nasal Cannula 2.0 


 


12/4/17 12:30  69 16  98 Nasal Cannula 2.0 


 


12/4/17 12:25      Room Air  


 


12/4/17 12:15  69 16  98 Nasal Cannula 2.0 


 


12/4/17 12:00  69 16  98 Nasal Cannula 2.0 


 


12/4/17 11:45  69 16  98 Nasal Cannula 2.0 


 


12/4/17 11:23   16  98 Nasal Cannula 2.0 


 


12/4/17 11:18  69 16  98 Nasal Cannula 2.0 


 


12/4/17 09:43      Room Air  








I & O











Intake and Output 


 


 12/5/17





 07:00


 


Intake Total 950 ml


 


Output Total 300 ml


 


Balance 650 ml


 


 


 


Intake Oral 950 ml


 


Output Urine Total 300 ml


 


# Voids 1











PHYSICAL EXAM


Physical Exam


Alert, oriented


RRR, no murmur


CTAB


Legs in boots





ASSESSMENT/PLAN


Assessment/Plan


RLE limb ischemia


CAD/A fib by hx/ CHF/ CABG


DM II


HLD


HTN


COPD


Former Smoker





Vascular planning procedure for today.


Problems:  





COMMENT


Lab





Laboratory Tests








Test


  12/4/17


11:50 12/4/17


13:12 12/4/17


17:02 12/4/17


20:54


 


Glucose (Fingerstick)


  71 mg/dL


(70-99) 98 mg/dL


(70-99) 209 mg/dL


(70-99) 206 mg/dL


(70-99)


 


Test


  12/5/17


06:54 


  


  


 


 


Glucose (Fingerstick)


  84 mg/dL


(70-99) 


  


  


 

















COURTNEY MARIE MD Dec 5, 2017 08:38

## 2017-12-05 NOTE — PDOC
VASCULAR BRIEF OPERATIVE NOTE


Date:  Dec 5, 2017


Pre-Op Diagnosis


Ischemic rest pain right foot


Post-Op Diagnosis


Same


Procedure Performed


Right femoral endarterectomy,  right profunda femoris endarterectomy proximal 

right superficial femoral endarterectomy and bovine pericardial patch 

angioplasty


Surgeon


RORY


Other


General anesthetic











AMANDA VALADEZ MD Dec 5, 2017 12:40

## 2017-12-06 VITALS — SYSTOLIC BLOOD PRESSURE: 121 MMHG | DIASTOLIC BLOOD PRESSURE: 60 MMHG

## 2017-12-06 VITALS — SYSTOLIC BLOOD PRESSURE: 157 MMHG | DIASTOLIC BLOOD PRESSURE: 69 MMHG

## 2017-12-06 VITALS — DIASTOLIC BLOOD PRESSURE: 65 MMHG | SYSTOLIC BLOOD PRESSURE: 137 MMHG

## 2017-12-06 VITALS — DIASTOLIC BLOOD PRESSURE: 71 MMHG | SYSTOLIC BLOOD PRESSURE: 158 MMHG

## 2017-12-06 VITALS — SYSTOLIC BLOOD PRESSURE: 141 MMHG | DIASTOLIC BLOOD PRESSURE: 69 MMHG

## 2017-12-06 LAB
ANION GAP SERPL CALC-SCNC: 12 MMOL/L (ref 6–14)
BUN SERPL-MCNC: 21 MG/DL (ref 8–26)
CALCIUM SERPL-MCNC: 9.3 MG/DL (ref 8.5–10.1)
CHLORIDE SERPL-SCNC: 100 MMOL/L (ref 98–107)
CO2 SERPL-SCNC: 24 MMOL/L (ref 21–32)
CREAT SERPL-MCNC: 1.6 MG/DL (ref 0.7–1.3)
ERYTHROCYTE [DISTWIDTH] IN BLOOD BY AUTOMATED COUNT: 14.9 % (ref 11.5–14.5)
GFR SERPLBLD BASED ON 1.73 SQ M-ARVRAT: 43.1 ML/MIN
GLUCOSE SERPL-MCNC: 176 MG/DL (ref 70–99)
HCT VFR BLD CALC: 47.1 % (ref 39–53)
HGB BLD-MCNC: 15.6 G/DL (ref 13–17.5)
MAGNESIUM SERPL-MCNC: 2.3 MG/DL (ref 1.8–2.4)
MCH RBC QN AUTO: 29 PG (ref 25–35)
MCHC RBC AUTO-ENTMCNC: 33 G/DL (ref 31–37)
MCV RBC AUTO: 87 FL (ref 79–100)
PLATELET # BLD AUTO: 264 X10^3/UL (ref 140–400)
POTASSIUM SERPL-SCNC: 4.6 MMOL/L (ref 3.5–5.1)
RBC # BLD AUTO: 5.45 X10^6/UL (ref 4.3–5.7)
SODIUM SERPL-SCNC: 136 MMOL/L (ref 136–145)
WBC # BLD AUTO: 8.4 X10^3/UL (ref 4–11)

## 2017-12-06 RX ADMIN — LISINOPRIL SCH MG: 40 TABLET ORAL at 07:52

## 2017-12-06 RX ADMIN — ALBUTEROL SULFATE SCH MG: 108 AEROSOL, METERED RESPIRATORY (INHALATION) at 20:25

## 2017-12-06 RX ADMIN — BUDESONIDE SCH MG: 0.5 INHALANT RESPIRATORY (INHALATION) at 08:09

## 2017-12-06 RX ADMIN — GABAPENTIN SCH MG: 300 CAPSULE ORAL at 07:50

## 2017-12-06 RX ADMIN — ALBUTEROL SULFATE SCH MG: 108 AEROSOL, METERED RESPIRATORY (INHALATION) at 15:15

## 2017-12-06 RX ADMIN — ATORVASTATIN CALCIUM SCH MG: 10 TABLET, FILM COATED ORAL at 21:09

## 2017-12-06 RX ADMIN — ASPIRIN 81 MG SCH MG: 81 TABLET ORAL at 07:55

## 2017-12-06 RX ADMIN — TAMSULOSIN HYDROCHLORIDE SCH MG: 0.4 CAPSULE ORAL at 07:55

## 2017-12-06 RX ADMIN — ATENOLOL SCH MG: 50 TABLET ORAL at 07:51

## 2017-12-06 RX ADMIN — DIGOXIN SCH MCG: 250 TABLET ORAL at 07:54

## 2017-12-06 RX ADMIN — INSULIN DETEMIR SCH UNITS: 100 INJECTION, SOLUTION SUBCUTANEOUS at 21:13

## 2017-12-06 RX ADMIN — ALBUTEROL SULFATE SCH MG: 108 AEROSOL, METERED RESPIRATORY (INHALATION) at 11:07

## 2017-12-06 RX ADMIN — FUROSEMIDE SCH MG: 40 TABLET ORAL at 07:50

## 2017-12-06 RX ADMIN — SERTRALINE HYDROCHLORIDE SCH MG: 50 TABLET ORAL at 07:50

## 2017-12-06 RX ADMIN — PANTOPRAZOLE SODIUM SCH MG: 40 TABLET, DELAYED RELEASE ORAL at 05:54

## 2017-12-06 RX ADMIN — ALBUTEROL SULFATE SCH MG: 108 AEROSOL, METERED RESPIRATORY (INHALATION) at 08:09

## 2017-12-06 RX ADMIN — BUDESONIDE SCH MG: 0.5 INHALANT RESPIRATORY (INHALATION) at 20:25

## 2017-12-06 NOTE — PDOC
Provider Note


Provider Note


Vascular


S: Patient states his foot pain has improved, minimal incisional pain


O: Alert and Ox3


VSS, afebrile


HRR


Non-labored respirations


Right groin, Prevena dressing in place and functioning, palpable Right DP, calf 

soft


A/P: PAD with ischemic right foot pain


Pain improving


POD #1 Right femoral endarterectomy,  right profunda femoris endarterectomy 

proximal right superficial femoral endarterectomy and bovine pericardial patch 

angioplasty


CBC, BMP, Magnesium level pending


D/C rehman


Up ad brook


Discussed disposition with patient home in 1-2 days is ok with Medicine


Patient will d/c with Prevena dressing to be removed in 5-7 days








Agree with JIMMY Yao re assessment and plan











YAZAN YAO Dec 6, 2017 08:43


SANKET EM MD Dec 6, 2017 10:17

## 2017-12-06 NOTE — PDOC
PROGRESS NOTES


Subjective


Subjective


No acute events overnight. Patient is now s/p R leg endarterectomy yesterday 

with already improvement in R leg/foot pain. Patient is up and walking. Vitals 

stable today. Denies any SOB, chest pain/discomfort, or other cardiac symptoms. 

No additional concerns at this time.





Objective


Objective





Vital Signs








  Date Time  Temp Pulse Resp B/P (MAP) Pulse Ox O2 Delivery O2 Flow Rate FiO2


 


12/6/17 08:10     96 Room Air  


 


12/6/17 07:55  94  158/71    


 


12/6/17 07:00 98.3  22     





 98.3       


 


12/5/17 18:15       2.0 














Intake and Output 


 


 12/6/17





 07:00


 


Intake Total 1100 ml


 


Output Total 1550 ml


 


Balance -450 ml


 


 


 


Intake Oral 1100 ml


 


Output Urine Total 1525 ml


 


Estimated Blood Loss 25 ml











Physical Exam


COMMENT


Alert, Awake, Oriented


In No Acute Distress


Lungs- Normal respiratory effort, minimal wheezing noted in lung fields b/l


No changes in cardiac exam. RRR w/o murmur


R LE with Provena in place. Booties on feet b/l





Assessment


Assessment


Problems


Medical Problems:


(1) Acute renal failure


Status: Acute  





(2) Cellulitis


Status: Acute  





(3) Hyperglycemia


Status: Acute  





(4) PVD (peripheral vascular disease)


Status: Acute  











Plan


Plan of Care


 Patient is now s/p right femoral endarterectomy, right profunda femoris 

endarterectomy, proximal right superficial femoral endarterectomy, and bovine 

pericardial patch angioplasty. Patient is well compensated cardiac-wise. Will 

continue to monitor, no changes needed cardiac wise at this time. 





Agree with plan to d/c to home in next 1-2 days if stable and agreed upon by 

medicine team.





Comment


Review of Relevant


I have reviewed the following items angi (where applicable) has been applied.


Labs





Laboratory Tests








Test


  12/4/17


11:50 12/4/17


13:12 12/4/17


17:02 12/4/17


20:54


 


Glucose (Fingerstick)


  71 mg/dL


(70-99) 98 mg/dL


(70-99) 209 mg/dL


(70-99) 206 mg/dL


(70-99)


 


Test


  12/5/17


06:54 12/5/17


10:11 12/5/17


14:16 12/5/17


16:12


 


Glucose (Fingerstick)


  84 mg/dL


(70-99) 86 mg/dL


(70-99) 90 mg/dL


(70-99) 124 mg/dL


(70-99)


 


Test


  12/5/17


20:49 12/6/17


07:55 12/6/17


08:13 


 


 


Glucose (Fingerstick)


  205 mg/dL


(70-99) 172 mg/dL


(70-99) 


  


 


 


White Blood Count


  


  


  8.4 x10^3/uL


(4.0-11.0) 


 


 


Red Blood Count


  


  


  5.45 x10^6/uL


(4.30-5.70) 


 


 


Hemoglobin


  


  


  15.6 g/dL


(13.0-17.5) 


 


 


Hematocrit


  


  


  47.1 %


(39.0-53.0) 


 


 


Mean Corpuscular Volume   87 fL ()  


 


Mean Corpuscular Hemoglobin   29 pg (25-35)  


 


Mean Corpuscular Hemoglobin


Concent 


  


  33 g/dL


(31-37) 


 


 


Red Cell Distribution Width


  


  


  14.9 %


(11.5-14.5) 


 


 


Platelet Count


  


  


  264 x10^3/uL


(140-400) 


 








Laboratory Tests








Test


  12/5/17


10:11 12/5/17


14:16 12/5/17


16:12 12/5/17


20:49


 


Glucose (Fingerstick)


  86 mg/dL


(70-99) 90 mg/dL


(70-99) 124 mg/dL


(70-99) 205 mg/dL


(70-99)


 


Test


  12/6/17


07:55 12/6/17


08:13 


  


 


 


Glucose (Fingerstick)


  172 mg/dL


(70-99) 


  


  


 


 


White Blood Count


  


  8.4 x10^3/uL


(4.0-11.0) 


  


 


 


Red Blood Count


  


  5.45 x10^6/uL


(4.30-5.70) 


  


 


 


Hemoglobin


  


  15.6 g/dL


(13.0-17.5) 


  


 


 


Hematocrit


  


  47.1 %


(39.0-53.0) 


  


 


 


Mean Corpuscular Volume  87 fL ()   


 


Mean Corpuscular Hemoglobin  29 pg (25-35)   


 


Mean Corpuscular Hemoglobin


Concent 


  33 g/dL


(31-37) 


  


 


 


Red Cell Distribution Width


  


  14.9 %


(11.5-14.5) 


  


 


 


Platelet Count


  


  264 x10^3/uL


(140-400) 


  


 








Medications





Current Medications


Fentanyl Citrate (Fentanyl 2ml Vial) 50 mcg PRN Q15MIN  PRN IV PAIN GREATER 

THAN 3/10 Last administered on 12/2/17at 02:12;  Start 12/1/17 at 13:00;  Stop 

12/2/17 at 02:56;  Status DC


Hydromorphone HCl (Dilaudid) 1 mg PRN Q15MIN  PRN IV/SQ PAIN GREATER THAN 3/10;

  Start 12/1/17 at 15:00;  Stop 12/2/17 at 14:59;  Status DC


Ondansetron HCl (Zofran) 4 mg PRN Q8HRS  PRN IV NAUSEA/VOMITING;  Start 12/1/17 

at 15:30;  Stop 12/2/17 at 15:29;  Status DC


Hydromorphone HCl (Dilaudid) 0.5 mg PRN Q4HRS  PRN IV PAIN;  Start 12/1/17 at 15

:30


Aspirin (Indigo Aspirin) 325 mg 1X  ONCE PO ;  Start 12/1/17 at 17:00;  Stop 12/1 /17 at 17:03;  Status DC


Heparin Sodium (Porcine) (Heparin Sodium) 5,000 unit 1X  ONCE IV ;  Start 12/1/ 17 at 17:45;  Stop 12/1/17 at 17:46;  Status DC


Heparin Sodium/ Dextrose 500 ml @ 0 mls/hr CONT  PRN IV SEE I/O RECORD;  Start 

12/1/17 at 17:45;  Stop 12/3/17 at 14:06;  Status DC


Tramadol HCl (Ultram) 50 mg PRN Q8HRS  PRN PO PAIN MILD TO MOD Last 

administered on 12/3/17at 20:35;  Start 12/1/17 at 19:30;  Stop 12/4/17 at 08:42

;  Status DC


Fentanyl Citrate (Fentanyl 2ml Vial) 50 mcg PRN Q2HR  PRN IV PAIN SEVERE Last 

administered on 12/4/17at 22:30;  Start 12/1/17 at 19:30


Amlodipine Besylate (Norvasc) 5 mg DAILY PO  Last administered on 12/2/17at 09:

00;  Start 12/2/17 at 09:00;  Stop 12/2/17 at 13:15;  Status DC


Aripiprazole (Abilify) 2 mg DAILY PO  Last administered on 12/2/17at 12:30;  

Start 12/2/17 at 09:00;  Stop 12/2/17 at 13:15;  Status DC


Aspirin (Children'S Aspirin) 81 mg DAILY08 PO  Last administered on 12/6/17at 07

:55;  Start 12/2/17 at 08:00


Atenolol (Tenormin) 25 mg DAILY PO  Last administered on 12/6/17at 07:51;  

Start 12/2/17 at 09:00


Digoxin (Lanoxin) 250 mcg DAILY PO  Last administered on 12/6/17at 07:54;  

Start 12/2/17 at 09:00


Diltiazem HCl (Cardizem 24hr Cd) 180 mg DAILY PO ;  Start 12/2/17 at 09:00;  

Stop 12/2/17 at 13:15;  Status DC


Furosemide (Lasix) 40 mg DAILY PO  Last administered on 12/6/17at 07:50;  Start 

12/2/17 at 09:00


Insulin Detemir (Levemir) 80 units HS SQ  Last administered on 12/5/17at 21:49;

  Start 12/1/17 at 21:00


Lisinopril (Prinivil) 40 mg DAILY PO  Last administered on 12/6/17at 07:52;  

Start 12/2/17 at 09:00


Metformin HCl (Glucophage) 500 mg TIDWMEALS PO ;  Start 12/2/17 at 08:00;  Stop 

12/2/17 at 08:00;  Status DC


Nitroglycerin (Nitrostat) 0.4 mg PRN Q5MIN  PRN SL CHEST PAIN;  Start 12/1/17 

at 20:00


Sertraline HCl (Zoloft) 50 mg DAILY PO  Last administered on 12/6/17at 07:50;  

Start 12/2/17 at 09:00


Spironolactone (Aldactone) 25 mg DAILY PO ;  Start 12/2/17 at 09:00;  Stop 12/3/

17 at 08:42;  Status DC


Albuterol Sulfate (Ventolin Neb Soln) 2.5 mg PRN TID  PRN NEB SOA;  Start 12/1/ 17 at 20:30


Alprazolam (Xanax) 0.5 mg PRN Q8HRS  PRN PO ANXIETY / AGITATION Last 

administered on 12/5/17at 23:56;  Start 12/1/17 at 20:15


Non-Formulary Medication 10.2 gm BID IH ;  Start 12/1/17 at 21:00;  Status UNV


Albuterol Sulfate (Ventolin Neb Soln) 2.5 mg RTQID NEB  Last administered on 12/ 6/17at 08:09;  Start 12/2/17 at 08:00


Gabapentin (Neurontin) 300 mg DAILY PO  Last administered on 12/6/17at 07:50;  

Start 12/2/17 at 09:00


Pantoprazole Sodium (Protonix) 40 mg DAILYAC PO  Last administered on 12/6/17at 

05:54;  Start 12/2/17 at 07:30


Atorvastatin Calcium (Lipitor) 5 mg QHS PO  Last administered on 12/5/17at 21:45

;  Start 12/1/17 at 21:00


Tamsulosin HCl (Flomax) 0.4 mg DAILY PO  Last administered on 12/6/17at 07:55;  

Start 12/2/17 at 09:00


Budesonide (Pulmicort) 0.5 mg RTBID NEB  Last administered on 12/6/17at 08:09;  

Start 12/2/17 at 08:00


Aspirin (Indigo Aspirin) 325 mg 1X  ONCE PO  Last administered on 12/1/17at 21:24

;  Start 12/1/17 at 21:30;  Stop 12/1/17 at 21:31;  Status DC


Amlodipine Besylate (Norvasc) 10 mg DAILY PO  Last administered on 12/6/17at 07:

55;  Start 12/3/17 at 09:00


Aripiprazole (Abilify) 2 mg QHS PO  Last administered on 12/5/17at 21:45;  

Start 12/3/17 at 21:00


Amlodipine Besylate (Norvasc) 5 mg 1X  ONCE PO  Last administered on 12/2/17at 

15:33;  Start 12/2/17 at 13:30;  Stop 12/2/17 at 13:31;  Status DC


Sodium Chloride 1,000 ml @  125 mls/hr 1X  ONCE IV  Last administered on 12/4/ 17at 08:54;  Start 12/4/17 at 08:30;  Stop 12/4/17 at 16:29;  Status DC


Tramadol HCl (Ultram) 100 mg PRN Q8HRS  PRN PO PAIN MILD TO MOD Last 

administered on 12/6/17at 07:55;  Start 12/4/17 at 08:45


Iodixanol (Visipaque 320) 100 ml STK-MED ONCE .ROUTE ;  Start 12/4/17 at 09:01;

  Stop 12/4/17 at 09:02;  Status DC


Lidocaine/Sodium Bicarbonate (Buffered Lidocaine 1%) 20 ml STK-MED ONCE IJ ;  

Start 12/4/17 at 09:01;  Stop 12/4/17 at 09:02;  Status DC


Heparin Sodium/ Sodium Chloride 1,000 ml @  As Directed STK-MED ONCE .ROUTE ;  

Start 12/4/17 at 09:01;  Stop 12/4/17 at 09:02;  Status DC


Midazolam HCl (Versed) 2 mg STK-MED ONCE .ROUTE ;  Start 12/4/17 at 09:51;  

Stop 12/4/17 at 09:52;  Status DC


Fentanyl Citrate (Fentanyl 2ml Vial) 100 mcg STK-MED ONCE .ROUTE ;  Start 12/4/ 17 at 09:51;  Stop 12/4/17 at 09:52;  Status DC


Heparin Sodium (Porcine) (Heparin Sodium) 10,000 unit STK-MED ONCE .ROUTE ;  

Start 12/4/17 at 09:51;  Stop 12/4/17 at 09:52;  Status DC


Heparin Sodium/ Sodium Chloride 1,000 unit 1X  ONCE IART  Last administered on 

12/4/17at 11:22;  Start 12/4/17 at 11:00;  Stop 12/4/17 at 11:01;  Status DC


Lidocaine/Sodium Bicarbonate (Buffered Lidocaine 1%) 20 ml 1X  ONCE IJ  Last 

administered on 12/4/17at 11:22;  Start 12/4/17 at 11:00;  Stop 12/4/17 at 11:01

;  Status DC


Midazolam HCl (Versed) 2 mg 1X  ONCE IV  Last administered on 12/4/17at 11:23;  

Start 12/4/17 at 11:00;  Stop 12/4/17 at 11:01;  Status DC


Fentanyl Citrate (Fentanyl 2ml Vial) 100 mcg 1X  ONCE IV  Last administered on 

12/4/17at 11:23;  Start 12/4/17 at 11:00;  Stop 12/4/17 at 11:01;  Status DC


Iodixanol (Visipaque 320) 100 ml 1X  ONCE IART  Last administered on 12/4/17at 

11:22;  Start 12/4/17 at 11:00;  Stop 12/4/17 at 11:01;  Status DC


Ondansetron HCl (Zofran) 4 mg PRN Q6HRS  PRN IV NAUSEA/VOMITING;  Start 12/5/17 

at 09:30;  Stop 12/6/17 at 09:29


Fentanyl Citrate (Fentanyl 2ml Vial) 25 mcg PRN Q5MIN  PRN IV MILD PAIN;  Start 

12/5/17 at 09:30;  Stop 12/6/17 at 09:29


Fentanyl Citrate (Fentanyl 2ml Vial) 50 mcg PRN Q5MIN  PRN IV MODERATE PAIN;  

Start 12/5/17 at 09:30;  Stop 12/6/17 at 09:29


Ringer's Solution 1,000 ml @  30 mls/hr Q24H IV  Last administered on 12/5/17at 

10:41;  Start 12/5/17 at 09:19;  Stop 12/5/17 at 21:18;  Status DC


Lidocaine HCl (Xylocaine-Mpf 1% Vial) 2 ml 1X PRN  PRN ID IV START;  Start 12/5/ 17 at 09:30;  Stop 12/6/17 at 09:29


Prochlorperazine Edisylate (Compazine) 5 mg PACU PRN  PRN IV NAUSEA, MRX1;  

Start 12/5/17 at 09:30;  Stop 12/6/17 at 09:29


Gelatin (Gelfoam  Size 100) 1 each STK-MED ONCE .ROUTE ;  Start 12/5/17 at 10:09

;  Stop 12/5/17 at 10:10;  Status DC


Iohexol (Omnipaque 300 Mg/ml) 100 ml STK-MED ONCE .ROUTE ;  Start 12/5/17 at 10:

09;  Stop 12/5/17 at 10:10;  Status DC


Cellulose 1 each STK-MED ONCE .ROUTE ;  Start 12/5/17 at 10:09;  Stop 12/5/17 

at 10:10;  Status DC


Papaverine HCl 60 mg STK-MED ONCE .ROUTE ;  Start 12/5/17 at 10:09;  Stop 12/5/ 17 at 10:10;  Status DC


Thrombin 20,000 unit STK-MED ONCE TP ;  Start 12/5/17 at 10:09;  Stop 12/5/17 

at 10:10;  Status DC


Heparin Sodium (Porcine) 5000 unit/Sodium Chloride 505 ml @  505 mls/hr 1X 

PERIOP  ONCE IRR  Last administered on 12/5/17at 13:26;  Start 12/5/17 at 10:12

;  Stop 12/5/17 at 11:11;  Status DC


Lidocaine HCl 48 ml/Sodium Bicarbonate 12 meq/Miscellaneous 60 ml @ 60 mls/hr 

1X PERIOP  ONCE ID  Last administered on 12/5/17at 12:49;  Start 12/5/17 at 11:

00;  Stop 12/5/17 at 11:59;  Status DC


Famotidine (Pepcid Vial) 20 mg STK-MED ONCE .ROUTE ;  Start 12/5/17 at 10:24;  

Stop 12/5/17 at 10:25;  Status DC


Rocuronium Bromide (Zemuron) 50 mg STK-MED ONCE .ROUTE ;  Start 12/5/17 at 10:24

;  Stop 12/5/17 at 10:25;  Status DC


Midazolam HCl (Versed) 2 mg STK-MED ONCE .ROUTE ;  Start 12/5/17 at 10:24;  

Stop 12/5/17 at 10:25;  Status DC


Fentanyl Citrate (Fentanyl 2ml Vial) 100 mcg STK-MED ONCE .ROUTE ;  Start 12/5/ 17 at 10:24;  Stop 12/5/17 at 10:25;  Status DC


Ephedrine Sulfate 50 mg STK-MED ONCE IV ;  Start 12/5/17 at 12:37;  Stop 12/5/ 17 at 12:38;  Status DC


Dexamethasone Sodium Phosphate (Decadron) 20 mg STK-MED ONCE .ROUTE ;  Start 12/ 5/17 at 12:38;  Stop 12/5/17 at 12:39;  Status DC


Lidocaine HCl (Lidocaine Pf 2% Vial) 5 ml STK-MED ONCE .ROUTE ;  Start 12/5/17 

at 12:38;  Stop 12/5/17 at 12:39;  Status DC


Ondansetron HCl (Zofran) 4 mg STK-MED ONCE .ROUTE ;  Start 12/5/17 at 12:38;  

Stop 12/5/17 at 12:39;  Status DC


Etomidate (Amidate) 20 mg STK-MED ONCE IV ;  Start 12/5/17 at 12:38;  Stop 12/5/ 17 at 12:39;  Status DC


Cefazolin Sodium/ Dextrose 50 ml @ As Directed STK-MED ONCE IV ;  Start 12/5/17 

at 12:46;  Stop 12/5/17 at 12:47;  Status DC


Heparin Sodium (Porcine) (Heparin Sodium) 10,000 unit STK-MED ONCE .ROUTE ;  

Start 12/5/17 at 12:54;  Stop 12/5/17 at 12:55;  Status DC


Bupivacaine HCl (Sensorcaine Mpf 0.25%) 30 ml STK-MED ONCE .ROUTE ;  Start 12/5/ 17 at 13:48;  Stop 12/5/17 at 13:49;  Status DC


Bupivacaine HCl (Sensorcaine Mpf 0.5%) 30 ml STK-MED ONCE .ROUTE  Last 

administered on 12/5/17at 14:32;  Start 12/5/17 at 13:49;  Stop 12/5/17 at 13:50

;  Status DC


Neostigmine Methylsulfate 5 mg STK-MED ONCE .ROUTE ;  Start 12/5/17 at 14:32;  

Stop 12/5/17 at 14:33;  Status DC


Glycopyrrolate (Robinul) 1 mg STK-MED ONCE .ROUTE ;  Start 12/5/17 at 14:32;  

Stop 12/5/17 at 14:33;  Status DC


Sevoflurane (Ultane) 90 ml STK-MED ONCE IH ;  Start 12/5/17 at 14:38;  Stop 12/5 /17 at 14:39;  Status DC


Hydromorphone HCl (Dilaudid) 0.5 mg PRN  Q10MIN IV  Last administered on 12/5/ 17at 17:06;  Start 12/5/17 at 16:30


Hydromorphone HCl (Dilaudid) 2 mg STK-MED ONCE .ROUTE ;  Start 12/5/17 at 16:35

;  Stop 12/5/17 at 16:36;  Status DC





Active Scripts


Active


Abilify (Aripiprazole) 2 Mg Tablet 2 Mg PO DAILY


Reported


Levemir Flextouch (Insulin Detemir) 100 Unit/1 Ml Insuln.pen 80 Unit SQ HS


Advair 250-50 Diskus (Fluticasone/Salmeterol) 1 Each Disk.w.dev 1 Puff IH BID


Atenolol 25 Mg Tablet 1 Tab PO DAILY


Spironolactone 25 Mg Tablet 1 Tab PO DAILY


Gabapentin 300 Mg Capsule 300 Mg PO DAILY


Amlodipine Besylate 5 Mg Tablet 5 Mg PO DAILY


Pravastatin Sodium 20 Mg Tablet 1 Tab PO DAILY


Omeprazole 20 Mg Capsule.dr 1 Cap PO BID


Zoloft (Sertraline Hcl) 50 Mg Tablet 1 Tab PO DAILY


Furosemide 40 Mg Tablet 1 Tab PO DAILY


Cardizem Cd (Diltiazem Hcl) 180 Mg Cap.er.24h 180 Mg PO DAILY


Digoxin 250 Mcg Tablet 250 Mcg PO DAILY


Proair Hfa Inhaler (Albuterol Sulfate) 8.5 Gm Hfa.aer.ad 8.5 Gm IH PRN TID PRN


Symbicort 160-4.5 Mcg Inhaler (Budesonide/Formoterol Fumarate) 10.2 Gm 

Hfa.aer.ad 10.2 Gm IH BID


NITROGLYCERIN SubLingual (Nitroglycerin) 0.4 Mg Tab.subl 0.4 Mg SL PRN Q5MIN PRN


Alprazolam 0.5 Mg Tab.rapdis 0.5 Mg PO PRN TID PRN


Indigo Chewable (Aspirin) 81 Mg Tab.chew 81 Mg PO DAILY


Metformin Hcl 500 Mg Tablet 500 Mg PO TID


Rapaflo (Silodosin) 8 Mg Capsule 8 Mg PO DAILY


Lisinopril 40 Mg Tablet 40 Mg PO DAILY


Vitals/I & O





Vital Sign - Last 24 Hours








 12/5/17 12/5/17 12/5/17 12/5/17





 10:23 15:00 15:00 15:15


 


Temp 97.7 97.3  97.7





 97.7 97.3  97.7


 


Pulse 50 82  68


 


Resp 18 18  18


 


B/P (MAP) 139/65 153/61  147/53


 


Pulse Ox 93 100  100


 


O2 Delivery Room Air Simple Mask Room Air Simple Mask


 


O2 Flow Rate  8 8 8


 


    





    





 12/5/17 12/5/17 12/5/17 12/5/17





 15:30 15:45 16:00 16:00


 


Temp  97.7  97.7





  97.7  97.7


 


Pulse 63 63  66


 


Resp 18 18  20


 


B/P (MAP) 156/55 153/72  171/63


 


Pulse Ox 95 95  93


 


O2 Delivery Nasal Cannula Nasal Cannula Nasal Cannula Nasal Cannula


 


O2 Flow Rate 2 2 2 2


 


    





    





 12/5/17 12/5/17 12/5/17 12/5/17





 16:15 16:30 16:41 16:45


 


Temp 97.7 97.2  97.2





 97.7 97.2  97.2


 


Pulse 66 67  60


 


Resp 20 20 20 20


 


B/P (MAP) 160/62 166/69  162/62


 


Pulse Ox 93 93 95 92


 


O2 Delivery Nasal Cannula Nasal Cannula Nasal Cannula Nasal Cannula


 


O2 Flow Rate 2 2 2.0 2.0


 


    





    





 12/5/17 12/5/17 12/5/17 12/5/17





 16:51 17:00 17:06 17:16


 


Temp  97.2  97.2





  97.2  97.2


 


Pulse  70  76


 


Resp 18 20 20 18


 


B/P (MAP)  140/63  159/71


 


Pulse Ox 92 93 93 95


 


O2 Delivery Room Air Nasal Cannula Nasal Cannula Nasal Cannula


 


O2 Flow Rate 2.0 2.0 2.0 2.0


 


    





    





 12/5/17 12/5/17 12/5/17 12/5/17





 17:30 17:35 17:45 18:00


 


Temp 97.6   





 97.6   


 


Pulse 89  95 95


 


Resp 20 20 20 20


 


B/P (MAP) 165/74 (104)  167/79 (108) 158/66 (96)


 


Pulse Ox 98 98 98 98


 


O2 Delivery Nasal Cannula Nasal Cannula Nasal Cannula Nasal Cannula


 


O2 Flow Rate 2.0 2.0 2.0 2.0


 


    





    





 12/5/17 12/5/17 12/5/17 12/5/17





 18:15 19:39 19:40 19:54


 


Temp  98.3  





  98.3  


 


Pulse 79 85  


 


Resp 20 16  


 


B/P (MAP) 149/70 (96) 166/72 (103)  


 


Pulse Ox 98 94  93


 


O2 Delivery Nasal Cannula Room Air Room Air Room Air


 


O2 Flow Rate 2.0   


 


    





    





 12/5/17 12/6/17 12/6/17 12/6/17





 22:39 07:00 07:51 07:52


 


Temp 98.2 98.3  





 98.2 98.3  


 


Pulse 91 94 91 91


 


Resp 16 22  


 


B/P (MAP) 168/74 (105) 158/71 (100) 168/74 168/74


 


Pulse Ox 91 98  


 


O2 Delivery Room Air Room Air  


 


    





    





 12/6/17 12/6/17 12/6/17 12/6/17





 07:54 07:55 07:55 08:10


 


Pulse 98 94  


 


B/P (MAP) 158/71 158/71  


 


Pulse Ox    96


 


O2 Delivery   Room Air Room Air














Intake and Output   


 


 12/5/17 12/5/17 12/6/17





 15:00 23:00 07:00


 


Intake Total  1000 ml 100 ml


 


Output Total 850 ml 150 ml 550 ml


 


Balance -850 ml 850 ml -450 ml

















MARIAM LEONARD MD Dec 6, 2017 09:00

## 2017-12-06 NOTE — PDOC
SUBJECTIVE


Subjective


Pt is s/p endarterectomy yesterday. Doing well today. Wound vac in place. Pain 

improved somewhat. No other concerns. Likely dc home tomorrow.





OBJECTIVE


Objective


Reviewed.


Vital Signs





Vital Signs








  Date Time  Temp Pulse Resp B/P (MAP) Pulse Ox O2 Delivery O2 Flow Rate FiO2


 


12/6/17 07:55      Room Air  


 


12/6/17 07:55  94  158/71    


 


12/6/17 07:54  98  158/71    


 


12/6/17 07:52  91  168/74    


 


12/6/17 07:51  91  168/74    


 


12/5/17 22:39 98.2 91 16 168/74 (105) 91 Room Air  





 98.2       


 


12/5/17 19:54     93 Room Air  


 


12/5/17 19:40      Room Air  


 


12/5/17 19:39 98.3 85 16 166/72 (103) 94 Room Air  





 98.3       


 


12/5/17 18:15  79 20 149/70 (96) 98 Nasal Cannula 2.0 


 


12/5/17 18:00  95 20 158/66 (96) 98 Nasal Cannula 2.0 


 


12/5/17 17:45  95 20 167/79 (108) 98 Nasal Cannula 2.0 


 


12/5/17 17:35   20  98 Nasal Cannula 2.0 


 


12/5/17 17:30 97.6 89 20 165/74 (104) 98 Nasal Cannula 2.0 





 97.6       


 


12/5/17 17:16 97.2 76 18 159/71 95 Nasal Cannula 2.0 





 97.2       


 


12/5/17 17:06   20  93 Nasal Cannula 2.0 


 


12/5/17 17:00 97.2 70 20 140/63 93 Nasal Cannula 2.0 





 97.2       


 


12/5/17 16:51   18  92 Room Air 2.0 


 


12/5/17 16:45 97.2 60 20 162/62 92 Nasal Cannula 2.0 





 97.2       


 


12/5/17 16:41   20  95 Nasal Cannula 2.0 


 


12/5/17 16:30 97.2 67 20 166/69 93 Nasal Cannula 2 





 97.2       


 


12/5/17 16:15 97.7 66 20 160/62 93 Nasal Cannula 2 





 97.7       


 


12/5/17 16:00 97.7 66 20 171/63 93 Nasal Cannula 2 





 97.7       


 


12/5/17 16:00      Nasal Cannula 2 


 


12/5/17 15:45 97.7 63 18 153/72 95 Nasal Cannula 2 





 97.7       


 


12/5/17 15:30  63 18 156/55 95 Nasal Cannula 2 


 


12/5/17 15:15 97.7 68 18 147/53 100 Simple Mask 8 





 97.7       


 


12/5/17 15:00      Room Air 8 


 


12/5/17 15:00 97.3 82 18 153/61 100 Simple Mask 8 





 97.3       


 


12/5/17 10:23 97.7 50 18 139/65 93 Room Air  





 97.7       








I & O











Intake and Output 


 


 12/6/17





 07:00


 


Intake Total 1100 ml


 


Output Total 1550 ml


 


Balance -450 ml


 


 


 


Intake Oral 1100 ml


 


Output Urine Total 1525 ml


 


Estimated Blood Loss 25 ml











PHYSICAL EXAM


Physical Exam


Alert, oriented


RRR, no murmur


CTAB


Wound vac in place R upper thigh


Legs in boots





ASSESSMENT/PLAN


Assessment/Plan


RLE limb ischemic pain s/p Right femoral endarterectomy,  right profunda 

femoris endarterectomy proximal right superficial femoral endarterectomy and 

bovine pericardial patch angioplasty


CAD/A fib by hx/ CHF/ CABG


DM II


HLD


HTN


COPD


Former Smoker





Likely dc tomorrow.


Problems:  





COMMENT


Lab





Laboratory Tests








Test


  12/5/17


10:11 12/5/17


14:16 12/5/17


16:12 12/5/17


20:49


 


Glucose (Fingerstick)


  86 mg/dL


(70-99) 90 mg/dL


(70-99) 124 mg/dL


(70-99) 205 mg/dL


(70-99)

















COURTNEY MARIE MD Dec 6, 2017 08:27

## 2017-12-07 VITALS
SYSTOLIC BLOOD PRESSURE: 140 MMHG | DIASTOLIC BLOOD PRESSURE: 59 MMHG | DIASTOLIC BLOOD PRESSURE: 59 MMHG | SYSTOLIC BLOOD PRESSURE: 140 MMHG

## 2017-12-07 VITALS — DIASTOLIC BLOOD PRESSURE: 54 MMHG | SYSTOLIC BLOOD PRESSURE: 123 MMHG

## 2017-12-07 VITALS — DIASTOLIC BLOOD PRESSURE: 60 MMHG | SYSTOLIC BLOOD PRESSURE: 132 MMHG

## 2017-12-07 VITALS — DIASTOLIC BLOOD PRESSURE: 68 MMHG | SYSTOLIC BLOOD PRESSURE: 146 MMHG

## 2017-12-07 RX ADMIN — LISINOPRIL SCH MG: 40 TABLET ORAL at 08:53

## 2017-12-07 RX ADMIN — ALBUTEROL SULFATE SCH MG: 108 AEROSOL, METERED RESPIRATORY (INHALATION) at 08:00

## 2017-12-07 RX ADMIN — TAMSULOSIN HYDROCHLORIDE SCH MG: 0.4 CAPSULE ORAL at 08:51

## 2017-12-07 RX ADMIN — BUDESONIDE SCH MG: 0.5 INHALANT RESPIRATORY (INHALATION) at 08:00

## 2017-12-07 RX ADMIN — ASPIRIN 81 MG SCH MG: 81 TABLET ORAL at 08:51

## 2017-12-07 RX ADMIN — ATENOLOL SCH MG: 50 TABLET ORAL at 08:52

## 2017-12-07 RX ADMIN — PANTOPRAZOLE SODIUM SCH MG: 40 TABLET, DELAYED RELEASE ORAL at 05:49

## 2017-12-07 RX ADMIN — SERTRALINE HYDROCHLORIDE SCH MG: 50 TABLET ORAL at 08:51

## 2017-12-07 RX ADMIN — GABAPENTIN SCH MG: 300 CAPSULE ORAL at 08:51

## 2017-12-07 RX ADMIN — ALBUTEROL SULFATE SCH MG: 108 AEROSOL, METERED RESPIRATORY (INHALATION) at 12:07

## 2017-12-07 RX ADMIN — DIGOXIN SCH MCG: 250 TABLET ORAL at 08:52

## 2017-12-07 RX ADMIN — FUROSEMIDE SCH MG: 40 TABLET ORAL at 08:51

## 2017-12-07 NOTE — PDOC
PROGRESS NOTES


Subjective


Subjective


Patient is feeling much better. Ambulating.





Complains of some foot swelling but he has been sitting dangling most of the 

day.





Objective


Objective





Vital Signs








  Date Time  Temp Pulse Resp B/P (MAP) Pulse Ox O2 Delivery O2 Flow Rate FiO2


 


12/7/17 14:28 97.9 65 18 140/59 (86) 96 Room Air  





 97.9       


 


12/5/17 18:15       2.0 














Intake and Output 


 


 12/7/17





 07:00


 


Intake Total 1750 ml


 


Output Total 0 ml


 


Balance 1750 ml


 


 


 


Intake Oral 1750 ml


 


Output Urine Total 0 ml











Physical Exam


Physical Exam


No significant changes in cardiac exam except for 1+ pitting edema of the right 

foot.





Assessment


Assessment


The patient appears to be compensated cardiac-wise.





From my perspective he may be discharged today.





As far as the edema of the right foot I would suggest to elevate the leg.





(4) PVD (peripheral vascular disease)


Status: Acute  








Comment


Review of Relevant


I have reviewed the following items angi (where applicable) has been applied.


Labs





Laboratory Tests








Test


  12/5/17


16:12 12/5/17


20:49 12/6/17


07:55 12/6/17


08:13


 


Glucose (Fingerstick)


  124 mg/dL


(70-99) 205 mg/dL


(70-99) 172 mg/dL


(70-99) 


 


 


White Blood Count


  


  


  


  8.4 x10^3/uL


(4.0-11.0)


 


Red Blood Count


  


  


  


  5.45 x10^6/uL


(4.30-5.70)


 


Hemoglobin


  


  


  


  15.6 g/dL


(13.0-17.5)


 


Hematocrit


  


  


  


  47.1 %


(39.0-53.0)


 


Mean Corpuscular Volume    87 fL () 


 


Mean Corpuscular Hemoglobin    29 pg (25-35) 


 


Mean Corpuscular Hemoglobin


Concent 


  


  


  33 g/dL


(31-37)


 


Red Cell Distribution Width


  


  


  


  14.9 %


(11.5-14.5)


 


Platelet Count


  


  


  


  264 x10^3/uL


(140-400)


 


Sodium Level


  


  


  


  136 mmol/L


(136-145)


 


Potassium Level


  


  


  


  4.6 mmol/L


(3.5-5.1)


 


Chloride Level


  


  


  


  100 mmol/L


()


 


Carbon Dioxide Level


  


  


  


  24 mmol/L


(21-32)


 


Anion Gap    12 (6-14) 


 


Blood Urea Nitrogen


  


  


  


  21 mg/dL


(8-26)


 


Creatinine


  


  


  


  1.6 mg/dL


(0.7-1.3)


 


Estimated GFR


(Cockcroft-Gault) 


  


  


  43.1 


 


 


Glucose Level


  


  


  


  176 mg/dL


(70-99)


 


Calcium Level


  


  


  


  9.3 mg/dL


(8.5-10.1)


 


Magnesium Level


  


  


  


  2.3 mg/dL


(1.8-2.4)


 


Test


  12/6/17


11:03 12/6/17


16:43 12/6/17


21:09 12/7/17


07:41


 


Glucose (Fingerstick)


  205 mg/dL


(70-99) 186 mg/dL


(70-99) 202 mg/dL


(70-99) 115 mg/dL


(70-99)


 


Test


  12/7/17


11:26 


  


  


 


 


Glucose (Fingerstick)


  125 mg/dL


(70-99) 


  


  


 








Laboratory Tests








Test


  12/6/17


16:43 12/6/17


21:09 12/7/17


07:41 12/7/17


11:26


 


Glucose (Fingerstick)


  186 mg/dL


(70-99) 202 mg/dL


(70-99) 115 mg/dL


(70-99) 125 mg/dL


(70-99)








Medications





Current Medications


Fentanyl Citrate (Fentanyl 2ml Vial) 50 mcg PRN Q15MIN  PRN IV PAIN GREATER 

THAN 3/10 Last administered on 12/2/17at 02:12;  Start 12/1/17 at 13:00;  Stop 

12/2/17 at 02:56;  Status DC


Hydromorphone HCl (Dilaudid) 1 mg PRN Q15MIN  PRN IV/SQ PAIN GREATER THAN 3/10;

  Start 12/1/17 at 15:00;  Stop 12/2/17 at 14:59;  Status DC


Ondansetron HCl (Zofran) 4 mg PRN Q8HRS  PRN IV NAUSEA/VOMITING;  Start 12/1/17 

at 15:30;  Stop 12/2/17 at 15:29;  Status DC


Hydromorphone HCl (Dilaudid) 0.5 mg PRN Q4HRS  PRN IV PAIN;  Start 12/1/17 at 15

:30;  Stop 12/7/17 at 15:28;  Status DC


Aspirin (Indigo Aspirin) 325 mg 1X  ONCE PO ;  Start 12/1/17 at 17:00;  Stop 12/1 /17 at 17:03;  Status DC


Heparin Sodium (Porcine) (Heparin Sodium) 5,000 unit 1X  ONCE IV ;  Start 12/1/ 17 at 17:45;  Stop 12/1/17 at 17:46;  Status DC


Heparin Sodium/ Dextrose 500 ml @ 0 mls/hr CONT  PRN IV SEE I/O RECORD;  Start 

12/1/17 at 17:45;  Stop 12/3/17 at 14:06;  Status DC


Tramadol HCl (Ultram) 50 mg PRN Q8HRS  PRN PO PAIN MILD TO MOD Last 

administered on 12/3/17at 20:35;  Start 12/1/17 at 19:30;  Stop 12/4/17 at 08:42

;  Status DC


Fentanyl Citrate (Fentanyl 2ml Vial) 50 mcg PRN Q2HR  PRN IV PAIN SEVERE Last 

administered on 12/4/17at 22:30;  Start 12/1/17 at 19:30;  Stop 12/7/17 at 15:28

;  Status DC


Amlodipine Besylate (Norvasc) 5 mg DAILY PO  Last administered on 12/2/17at 09:

00;  Start 12/2/17 at 09:00;  Stop 12/2/17 at 13:15;  Status DC


Aripiprazole (Abilify) 2 mg DAILY PO  Last administered on 12/2/17at 12:30;  

Start 12/2/17 at 09:00;  Stop 12/2/17 at 13:15;  Status DC


Aspirin (Children'S Aspirin) 81 mg DAILY08 PO  Last administered on 12/7/17at 08

:51;  Start 12/2/17 at 08:00;  Stop 12/7/17 at 15:28;  Status DC


Atenolol (Tenormin) 25 mg DAILY PO  Last administered on 12/7/17at 08:52;  

Start 12/2/17 at 09:00;  Stop 12/7/17 at 15:28;  Status DC


Digoxin (Lanoxin) 250 mcg DAILY PO  Last administered on 12/7/17at 08:52;  

Start 12/2/17 at 09:00;  Stop 12/7/17 at 15:28;  Status DC


Diltiazem HCl (Cardizem 24hr Cd) 180 mg DAILY PO ;  Start 12/2/17 at 09:00;  

Stop 12/2/17 at 13:15;  Status DC


Furosemide (Lasix) 40 mg DAILY PO  Last administered on 12/7/17at 08:51;  Start 

12/2/17 at 09:00;  Stop 12/7/17 at 15:28;  Status DC


Insulin Detemir (Levemir) 80 units HS SQ  Last administered on 12/6/17at 21:13;

  Start 12/1/17 at 21:00;  Stop 12/7/17 at 15:28;  Status DC


Lisinopril (Prinivil) 40 mg DAILY PO  Last administered on 12/7/17at 08:53;  

Start 12/2/17 at 09:00;  Stop 12/7/17 at 15:28;  Status DC


Metformin HCl (Glucophage) 500 mg TIDWMEALS PO ;  Start 12/2/17 at 08:00;  Stop 

12/2/17 at 08:00;  Status DC


Nitroglycerin (Nitrostat) 0.4 mg PRN Q5MIN  PRN SL CHEST PAIN;  Start 12/1/17 

at 20:00;  Stop 12/7/17 at 15:28;  Status DC


Sertraline HCl (Zoloft) 50 mg DAILY PO  Last administered on 12/7/17at 08:51;  

Start 12/2/17 at 09:00;  Stop 12/7/17 at 15:28;  Status DC


Spironolactone (Aldactone) 25 mg DAILY PO ;  Start 12/2/17 at 09:00;  Stop 12/3/

17 at 08:42;  Status DC


Albuterol Sulfate (Ventolin Neb Soln) 2.5 mg PRN TID  PRN NEB SOA;  Start 12/1/ 17 at 20:30;  Stop 12/7/17 at 15:28;  Status DC


Alprazolam (Xanax) 0.5 mg PRN Q8HRS  PRN PO ANXIETY / AGITATION Last 

administered on 12/7/17at 08:59;  Start 12/1/17 at 20:15;  Stop 12/7/17 at 15:28

;  Status DC


Non-Formulary Medication 10.2 gm BID IH ;  Start 12/1/17 at 21:00;  Status UNV


Albuterol Sulfate (Ventolin Neb Soln) 2.5 mg RTQID NEB  Last administered on 12/ 7/17at 12:07;  Start 12/2/17 at 08:00;  Stop 12/7/17 at 15:28;  Status DC


Gabapentin (Neurontin) 300 mg DAILY PO  Last administered on 12/7/17at 08:51;  

Start 12/2/17 at 09:00;  Stop 12/7/17 at 15:28;  Status DC


Pantoprazole Sodium (Protonix) 40 mg DAILYAC PO  Last administered on 12/7/17at 

05:49;  Start 12/2/17 at 07:30;  Stop 12/7/17 at 15:28;  Status DC


Atorvastatin Calcium (Lipitor) 5 mg QHS PO  Last administered on 12/6/17at 21:09

;  Start 12/1/17 at 21:00;  Stop 12/7/17 at 15:28;  Status DC


Tamsulosin HCl (Flomax) 0.4 mg DAILY PO  Last administered on 12/7/17at 08:51;  

Start 12/2/17 at 09:00;  Stop 12/7/17 at 15:28;  Status DC


Budesonide (Pulmicort) 0.5 mg RTBID NEB  Last administered on 12/6/17at 20:25;  

Start 12/2/17 at 08:00;  Stop 12/7/17 at 15:28;  Status DC


Aspirin (Indigo Aspirin) 325 mg 1X  ONCE PO  Last administered on 12/1/17at 21:24

;  Start 12/1/17 at 21:30;  Stop 12/1/17 at 21:31;  Status DC


Amlodipine Besylate (Norvasc) 10 mg DAILY PO  Last administered on 12/7/17at 08:

53;  Start 12/3/17 at 09:00;  Stop 12/7/17 at 15:28;  Status DC


Aripiprazole (Abilify) 2 mg QHS PO  Last administered on 12/6/17at 21:09;  

Start 12/3/17 at 21:00;  Stop 12/7/17 at 15:28;  Status DC


Amlodipine Besylate (Norvasc) 5 mg 1X  ONCE PO  Last administered on 12/2/17at 

15:33;  Start 12/2/17 at 13:30;  Stop 12/2/17 at 13:31;  Status DC


Sodium Chloride 1,000 ml @  125 mls/hr 1X  ONCE IV  Last administered on 12/4/ 17at 08:54;  Start 12/4/17 at 08:30;  Stop 12/4/17 at 16:29;  Status DC


Tramadol HCl (Ultram) 100 mg PRN Q8HRS  PRN PO PAIN MILD TO MOD Last 

administered on 12/6/17at 17:16;  Start 12/4/17 at 08:45;  Stop 12/7/17 at 15:28

;  Status DC


Iodixanol (Visipaque 320) 100 ml STK-MED ONCE .ROUTE ;  Start 12/4/17 at 09:01;

  Stop 12/4/17 at 09:02;  Status DC


Lidocaine/Sodium Bicarbonate (Buffered Lidocaine 1%) 20 ml STK-MED ONCE IJ ;  

Start 12/4/17 at 09:01;  Stop 12/4/17 at 09:02;  Status DC


Heparin Sodium/ Sodium Chloride 1,000 ml @  As Directed STK-MED ONCE .ROUTE ;  

Start 12/4/17 at 09:01;  Stop 12/4/17 at 09:02;  Status DC


Midazolam HCl (Versed) 2 mg STK-MED ONCE .ROUTE ;  Start 12/4/17 at 09:51;  

Stop 12/4/17 at 09:52;  Status DC


Fentanyl Citrate (Fentanyl 2ml Vial) 100 mcg STK-MED ONCE .ROUTE ;  Start 12/4/ 17 at 09:51;  Stop 12/4/17 at 09:52;  Status DC


Heparin Sodium (Porcine) (Heparin Sodium) 10,000 unit STK-MED ONCE .ROUTE ;  

Start 12/4/17 at 09:51;  Stop 12/4/17 at 09:52;  Status DC


Heparin Sodium/ Sodium Chloride 1,000 unit 1X  ONCE IART  Last administered on 

12/4/17at 11:22;  Start 12/4/17 at 11:00;  Stop 12/4/17 at 11:01;  Status DC


Lidocaine/Sodium Bicarbonate (Buffered Lidocaine 1%) 20 ml 1X  ONCE IJ  Last 

administered on 12/4/17at 11:22;  Start 12/4/17 at 11:00;  Stop 12/4/17 at 11:01

;  Status DC


Midazolam HCl (Versed) 2 mg 1X  ONCE IV  Last administered on 12/4/17at 11:23;  

Start 12/4/17 at 11:00;  Stop 12/4/17 at 11:01;  Status DC


Fentanyl Citrate (Fentanyl 2ml Vial) 100 mcg 1X  ONCE IV  Last administered on 

12/4/17at 11:23;  Start 12/4/17 at 11:00;  Stop 12/4/17 at 11:01;  Status DC


Iodixanol (Visipaque 320) 100 ml 1X  ONCE IART  Last administered on 12/4/17at 

11:22;  Start 12/4/17 at 11:00;  Stop 12/4/17 at 11:01;  Status DC


Ondansetron HCl (Zofran) 4 mg PRN Q6HRS  PRN IV NAUSEA/VOMITING;  Start 12/5/17 

at 09:30;  Stop 12/6/17 at 09:29;  Status DC


Fentanyl Citrate (Fentanyl 2ml Vial) 25 mcg PRN Q5MIN  PRN IV MILD PAIN;  Start 

12/5/17 at 09:30;  Stop 12/6/17 at 09:29;  Status DC


Fentanyl Citrate (Fentanyl 2ml Vial) 50 mcg PRN Q5MIN  PRN IV MODERATE PAIN;  

Start 12/5/17 at 09:30;  Stop 12/6/17 at 09:29;  Status DC


Ringer's Solution 1,000 ml @  30 mls/hr Q24H IV  Last administered on 12/5/17at 

10:41;  Start 12/5/17 at 09:19;  Stop 12/5/17 at 21:18;  Status DC


Lidocaine HCl (Xylocaine-Mpf 1% Vial) 2 ml 1X PRN  PRN ID IV START;  Start 12/5/ 17 at 09:30;  Stop 12/6/17 at 09:29;  Status DC


Prochlorperazine Edisylate (Compazine) 5 mg PACU PRN  PRN IV NAUSEA, MRX1;  

Start 12/5/17 at 09:30;  Stop 12/6/17 at 09:29;  Status DC


Gelatin (Gelfoam  Size 100) 1 each STK-MED ONCE .ROUTE ;  Start 12/5/17 at 10:09

;  Stop 12/5/17 at 10:10;  Status DC


Iohexol (Omnipaque 300 Mg/ml) 100 ml STK-MED ONCE .ROUTE ;  Start 12/5/17 at 10:

09;  Stop 12/5/17 at 10:10;  Status DC


Cellulose 1 each STK-MED ONCE .ROUTE ;  Start 12/5/17 at 10:09;  Stop 12/5/17 

at 10:10;  Status DC


Papaverine HCl 60 mg STK-MED ONCE .ROUTE ;  Start 12/5/17 at 10:09;  Stop 12/5/ 17 at 10:10;  Status DC


Thrombin 20,000 unit STK-MED ONCE TP ;  Start 12/5/17 at 10:09;  Stop 12/5/17 

at 10:10;  Status DC


Heparin Sodium (Porcine) 5000 unit/Sodium Chloride 505 ml @  505 mls/hr 1X 

PERIOP  ONCE IRR  Last administered on 12/5/17at 13:26;  Start 12/5/17 at 10:12

;  Stop 12/5/17 at 11:11;  Status DC


Lidocaine HCl 48 ml/Sodium Bicarbonate 12 meq/Miscellaneous 60 ml @ 60 mls/hr 

1X PERIOP  ONCE ID  Last administered on 12/5/17at 12:49;  Start 12/5/17 at 11:

00;  Stop 12/5/17 at 11:59;  Status DC


Famotidine (Pepcid Vial) 20 mg STK-MED ONCE .ROUTE ;  Start 12/5/17 at 10:24;  

Stop 12/5/17 at 10:25;  Status DC


Rocuronium Bromide (Zemuron) 50 mg STK-MED ONCE .ROUTE ;  Start 12/5/17 at 10:24

;  Stop 12/5/17 at 10:25;  Status DC


Midazolam HCl (Versed) 2 mg STK-MED ONCE .ROUTE ;  Start 12/5/17 at 10:24;  

Stop 12/5/17 at 10:25;  Status DC


Fentanyl Citrate (Fentanyl 2ml Vial) 100 mcg STK-MED ONCE .ROUTE ;  Start 12/5/ 17 at 10:24;  Stop 12/5/17 at 10:25;  Status DC


Ephedrine Sulfate 50 mg STK-MED ONCE IV ;  Start 12/5/17 at 12:37;  Stop 12/5/ 17 at 12:38;  Status DC


Dexamethasone Sodium Phosphate (Decadron) 20 mg STK-MED ONCE .ROUTE ;  Start 12/ 5/17 at 12:38;  Stop 12/5/17 at 12:39;  Status DC


Lidocaine HCl (Lidocaine Pf 2% Vial) 5 ml STK-MED ONCE .ROUTE ;  Start 12/5/17 

at 12:38;  Stop 12/5/17 at 12:39;  Status DC


Ondansetron HCl (Zofran) 4 mg STK-MED ONCE .ROUTE ;  Start 12/5/17 at 12:38;  

Stop 12/5/17 at 12:39;  Status DC


Etomidate (Amidate) 20 mg STK-MED ONCE IV ;  Start 12/5/17 at 12:38;  Stop 12/5/ 17 at 12:39;  Status DC


Cefazolin Sodium/ Dextrose 50 ml @ As Directed STK-MED ONCE IV ;  Start 12/5/17 

at 12:46;  Stop 12/5/17 at 12:47;  Status DC


Heparin Sodium (Porcine) (Heparin Sodium) 10,000 unit STK-MED ONCE .ROUTE ;  

Start 12/5/17 at 12:54;  Stop 12/5/17 at 12:55;  Status DC


Bupivacaine HCl (Sensorcaine Mpf 0.25%) 30 ml STK-MED ONCE .ROUTE ;  Start 12/5/ 17 at 13:48;  Stop 12/5/17 at 13:49;  Status DC


Bupivacaine HCl (Sensorcaine Mpf 0.5%) 30 ml STK-MED ONCE .ROUTE  Last 

administered on 12/5/17at 14:32;  Start 12/5/17 at 13:49;  Stop 12/5/17 at 13:50

;  Status DC


Neostigmine Methylsulfate 5 mg STK-MED ONCE .ROUTE ;  Start 12/5/17 at 14:32;  

Stop 12/5/17 at 14:33;  Status DC


Glycopyrrolate (Robinul) 1 mg STK-MED ONCE .ROUTE ;  Start 12/5/17 at 14:32;  

Stop 12/5/17 at 14:33;  Status DC


Sevoflurane (Ultane) 90 ml STK-MED ONCE IH ;  Start 12/5/17 at 14:38;  Stop 12/5 /17 at 14:39;  Status DC


Hydromorphone HCl (Dilaudid) 0.5 mg PRN  Q10MIN IV  Last administered on 12/5/ 17at 17:06;  Start 12/5/17 at 16:30;  Stop 12/6/17 at 13:14;  Status DC


Hydromorphone HCl (Dilaudid) 2 mg STK-MED ONCE .ROUTE ;  Start 12/5/17 at 16:35

;  Stop 12/5/17 at 16:36;  Status DC





Active Scripts


Active


Abilify (Aripiprazole) 2 Mg Tablet 2 Mg PO DAILY


Reported


Advair 250-50 Diskus (Fluticasone/Salmeterol) 1 Each Disk.w.dev 1 Puff IH BID


Atenolol 25 Mg Tablet 1 Tab PO DAILY


Spironolactone 25 Mg Tablet 1 Tab PO DAILY


Gabapentin 300 Mg Capsule 300 Mg PO DAILY


Amlodipine Besylate 5 Mg Tablet 5 Mg PO DAILY


Pravastatin Sodium 20 Mg Tablet 1 Tab PO DAILY


Omeprazole 20 Mg Capsule.dr 1 Cap PO BID


Zoloft (Sertraline Hcl) 50 Mg Tablet 1 Tab PO DAILY


Furosemide 40 Mg Tablet 1 Tab PO DAILY


Cardizem Cd (Diltiazem Hcl) 180 Mg Cap.er.24h 180 Mg PO DAILY


Digoxin 250 Mcg Tablet 250 Mcg PO DAILY


Proair Hfa Inhaler (Albuterol Sulfate) 8.5 Gm Hfa.aer.ad 8.5 Gm IH PRN TID PRN


Symbicort 160-4.5 Mcg Inhaler (Budesonide/Formoterol Fumarate) 10.2 Gm 

Hfa.aer.ad 10.2 Gm IH BID


NITROGLYCERIN SubLingual (Nitroglycerin) 0.4 Mg Tab.subl 0.4 Mg SL PRN Q5MIN PRN


Alprazolam 0.5 Mg Tab.rapdis 0.5 Mg PO PRN TID PRN


Indigo Chewable (Aspirin) 81 Mg Tab.chew 81 Mg PO DAILY


Metformin Hcl 500 Mg Tablet 500 Mg PO TID


Rapaflo (Silodosin) 8 Mg Capsule 8 Mg PO DAILY


Lisinopril 40 Mg Tablet 40 Mg PO DAILY


Vitals/I & O





Vital Sign - Last 24 Hours








 12/6/17 12/6/17 12/6/17 12/6/17





 17:16 19:46 19:55 22:25


 


Temp  97.2  98.0





  97.2  98.0


 


Pulse  57  53


 


Resp  16  18


 


B/P (MAP)  157/69 (98)  137/65 (89)


 


Pulse Ox  93  91


 


O2 Delivery Room Air Room Air Room Air Room Air


 


    





    





 12/7/17 12/7/17 12/7/17 12/7/17





 02:44 07:42 07:50 08:40


 


Temp 97.7 98.5  





 97.7 98.5  


 


Pulse 58 65  


 


Resp 16 16  


 


B/P (MAP) 123/54 (77) 146/68 (94)  


 


Pulse Ox 95 96  96


 


O2 Delivery Room Air Room Air Room Air Room Air


 


    





    





 12/7/17 12/7/17 12/7/17 12/7/17





 08:52 08:52 08:53 08:53


 


Pulse 65 65 65 65


 


B/P (MAP) 146/68 146/68 146/68 146/68





 12/7/17 12/7/17 12/7/17 





 11:22 12:07 14:28 


 


Temp 97.7  97.9 





 97.7  97.9 


 


Pulse 62  65 


 


Resp 18  18 


 


B/P (MAP) 132/60 (84)  140/59 (86) 


 


Pulse Ox 97  96 


 


O2 Delivery Room Air Room Air Room Air 














Intake and Output   


 


 12/6/17 12/6/17 12/7/17





 15:00 23:00 07:00


 


Intake Total 500 ml 1050 ml 200 ml


 


Output Total   0 ml


 


Balance 500 ml 1050 ml 200 ml

















MARIAM LEONARD MD Dec 7, 2017 15:38

## 2017-12-07 NOTE — PDOC
Provider Note


Provider Note


Vascular


S: Patient states his foot pain has improved, minimal incisional pain


O: Alert and Ox3


VSS, afebrile


HRR


Non-labored respirations


Right groin, Prevena dressing in place and functioning, palpable Right DP, calf 

soft, moderate swelling in lower leg and foot


A/P: PAD with ischemic right foot pain


Pain improving


POD #1 Right femoral endarterectomy,  right profunda femoris endarterectomy 

proximal right superficial femoral endarterectomy and bovine pericardial patch 

angioplasty


Continue ASA


Patient will d/c with Prevena dressing to be removed on Monday, may shower then


Activity as tolerated


F/U with Dr. Ascencio1/3/2017 10:30











YAZAN SANTILLAN Dec 7, 2017 13:00

## 2017-12-07 NOTE — PDOC3
Discharge Summary [facility]


Date of Admission:  Dec 2, 2017


Discharge Date:  Dec 7, 2017


Admitting Diagnosis


RLE ischemia, rest pain


Problems:  


Final Diagnosis


Peripheral arterial disease, RLE ischemia


CONSULTS


Dr. Ascencio, Dr. Carlton


Procedures


Right femoral endarterectomy,  right profunda femoris endarterectomy proximal 

right superficial femoral endarterectomy and bovine pericardial patch 

angioplasty


Brief Hospital Course


Mr. Pan  is a 67 old male who presented with RLE pain and was admitted for 

work up out of concern for RLE ischemia, PAD. An US and angiogram were done 

which showed significant vascular disease. A R femoral endarterectomy was 

performed on 12/5/17. He had an uncomplicated post operative course with pain 

improvement and was discharged home in stable condition. He had a provena wound 

vac in place that will be removed in 5-7 days. He will follow up with Cardiology

, Vascular surgery and in the office in 1-2 weeks. No changes to medications.


Problems:  


CONDITION AT DISCHARGE:  Improved


Diet


Cardiac


Scheduled


Amlodipine Besylate (Amlodipine Besylate), 5 MG PO DAILY, (Reported)


Aripiprazole (Abilify), 2 MG PO DAILY


Aspirin (Indigo Chewable), 81 MG PO DAILY, (Reported)


Atenolol (Atenolol), 1 TAB PO DAILY, (Reported)


Budesonide/Formoterol Fumarate (Symbicort 160-4.5 Mcg Inhaler), 10.2 GM IH BID, 

(Reported)


Digoxin (Digoxin), 250 MCG PO DAILY, (Reported)


Diltiazem Hcl (Cardizem Cd), 180 MG PO DAILY, (Reported)


Fluticasone/Salmeterol (Advair 250-50 Diskus), 1 PUFF IH BID, (Reported)


Furosemide (Furosemide), 1 TAB PO DAILY, (Reported)


Gabapentin (Gabapentin), 300 MG PO DAILY, (Reported)


Insulin Detemir (Levemir Flextouch), 80 UNIT SQ HS, (Reported)


Lisinopril (Lisinopril), 40 MG PO DAILY, (Reported)


Metformin Hcl (Metformin Hcl), 500 MG PO TID, (Reported)


Omeprazole (Omeprazole), 1 CAP PO BID, (Reported)


Pravastatin Sodium (Pravastatin Sodium), 1 TAB PO DAILY, (Reported)


Sertraline Hcl (Zoloft), 1 TAB PO DAILY, (Reported)


Silodosin (Rapaflo), 8 MG PO DAILY, (Reported)


Spironolactone (Spironolactone), 1 TAB PO DAILY, (Reported)





Scheduled PRN


Albuterol Sulfate (Proair Hfa Inhaler), 8.5 GM IH PRN TID PRN for SEE COMMENTS, 

(Reported)


Alprazolam (Alprazolam), 0.5 MG PO PRN TID PRN for ANXIETY / AGITATION, (

Reported)


Nitroglycerin (NITROGLYCERIN SubLingual), 0.4 MG SL PRN Q5MIN PRN for CHEST PAIN

, (Reported)





Discontinued Medications


Diltiazem Hcl (Cartia Xt), 180 MG PO, (Reported)


Insulin Glargine,Hum.rec.anlog (Lantus Solostar), 80 UNIT SQ QHS, (Reported)


Follow Up


1-2 weeks with Dr. Teran or COUTRNEY Love MD Dec 7, 2017 16:53

## 2017-12-07 NOTE — PDOC
SUBJECTIVE


Subjective


Doing well. Likely dc home today pending surgery, cards input. f/u in office in 

1 week.





OBJECTIVE


Objective


reviewed.


Vital Signs





Vital Signs








  Date Time  Temp Pulse Resp B/P (MAP) Pulse Ox O2 Delivery O2 Flow Rate FiO2


 


12/7/17 08:53  65  146/68    


 


12/7/17 08:53  65  146/68    


 


12/7/17 08:52  65  146/68    


 


12/7/17 08:52  65  146/68    


 


12/7/17 08:40     96 Room Air  


 


12/7/17 07:50      Room Air  


 


12/7/17 07:42 98.5 65 16 146/68 (94) 96 Room Air  





 98.5       


 


12/7/17 02:44 97.7 58 16 123/54 (77) 95 Room Air  





 97.7       


 


12/6/17 22:25 98.0 53 18 137/65 (89) 91 Room Air  





 98.0       


 


12/6/17 19:55      Room Air  


 


12/6/17 19:46 97.2 57 16 157/69 (98) 93 Room Air  





 97.2       


 


12/6/17 17:16      Room Air  


 


12/6/17 15:16      Room Air  


 


12/6/17 15:00 98.1 71 20 141/69 (93) 97 Room Air  





 98.1       


 


12/6/17 11:26      Room Air  


 


12/6/17 11:00 97.4 85 19 121/60 (80) 95 Nasal Cannula  





 97.4       








I & O











Intake and Output 


 


 12/7/17





 07:00


 


Intake Total 1750 ml


 


Output Total 0 ml


 


Balance 1750 ml


 


 


 


Intake Oral 1750 ml


 


Output Urine Total 0 ml











PHYSICAL EXAM


Physical Exam


Unchanged





ASSESSMENT/PLAN


Assessment/Plan


Likely dc.


Problems:  





COMMENT


Lab





Laboratory Tests








Test


  12/6/17


11:03 12/6/17


16:43 12/6/17


21:09 12/7/17


07:41


 


Glucose (Fingerstick)


  205 mg/dL


(70-99) 186 mg/dL


(70-99) 202 mg/dL


(70-99) 115 mg/dL


(70-99)

















COURTNEY MARIE MD Dec 7, 2017 09:33

## 2018-04-12 ENCOUNTER — HOSPITAL ENCOUNTER (OUTPATIENT)
Dept: HOSPITAL 61 - US | Age: 68
Discharge: HOME | End: 2018-04-12
Attending: FAMILY MEDICINE
Payer: COMMERCIAL

## 2018-04-12 DIAGNOSIS — M79.89: Primary | ICD-10-CM

## 2018-04-12 PROCEDURE — 93971 EXTREMITY STUDY: CPT

## 2018-04-16 ENCOUNTER — HOSPITAL ENCOUNTER (OUTPATIENT)
Dept: HOSPITAL 61 - KCIC US | Age: 68
Discharge: HOME | End: 2018-04-16
Attending: FAMILY MEDICINE
Payer: COMMERCIAL

## 2018-04-16 DIAGNOSIS — N17.9: Primary | ICD-10-CM

## 2018-04-16 DIAGNOSIS — I11.0: ICD-10-CM

## 2018-04-16 DIAGNOSIS — I50.9: ICD-10-CM

## 2018-04-16 DIAGNOSIS — J44.9: ICD-10-CM

## 2018-04-16 DIAGNOSIS — E11.40: ICD-10-CM

## 2018-04-16 PROCEDURE — 76770 US EXAM ABDO BACK WALL COMP: CPT

## 2018-09-26 ENCOUNTER — HOSPITAL ENCOUNTER (OUTPATIENT)
Dept: HOSPITAL 61 - KCIC | Age: 68
Discharge: HOME | End: 2018-09-26
Attending: INTERNAL MEDICINE
Payer: COMMERCIAL

## 2018-09-26 DIAGNOSIS — Z86.79: ICD-10-CM

## 2018-09-26 DIAGNOSIS — I51.7: ICD-10-CM

## 2018-09-26 DIAGNOSIS — J44.9: Primary | ICD-10-CM

## 2018-09-26 PROCEDURE — 71046 X-RAY EXAM CHEST 2 VIEWS: CPT

## 2018-09-26 NOTE — [facility]
CHEST PA   LATERAL

 

History: Chronic dyspnea worsening at night, CHF, COPD, wheezing. 

 

Comparison: AP chest, December 1, 2017.

 

Findings: 

Stable median sternotomy wires. Changes of CABG. Stable cardiomegaly. 

Stable suture material in the right lung apex. The lungs are clear. Lungs 

are hyperexpanded. No pleural abnormality. Thoracic spine findings 

compatible with diffuse idiopathic skeletal hyperostosis.

 

IMPRESSION: 

1.  No acute cardiopulmonary process. 

2.  COPD.

 

 

Electronically signed by: Wayne Fajardo MD (9/26/2018 5:07 PM) [id number]

## 2019-08-12 VITALS — SYSTOLIC BLOOD PRESSURE: 155 MMHG | DIASTOLIC BLOOD PRESSURE: 70 MMHG

## 2019-10-11 ENCOUNTER — HOSPITAL ENCOUNTER (OUTPATIENT)
Dept: HOSPITAL 61 - KCIC MRI | Age: 69
Discharge: HOME | End: 2019-10-11
Attending: FAMILY MEDICINE
Payer: COMMERCIAL

## 2019-10-11 DIAGNOSIS — G93.89: Primary | ICD-10-CM

## 2019-10-11 DIAGNOSIS — Z98.41: ICD-10-CM

## 2019-10-11 DIAGNOSIS — J44.9: ICD-10-CM

## 2019-10-11 PROCEDURE — 70551 MRI BRAIN STEM W/O DYE: CPT

## 2019-10-11 NOTE — [facility]
EXAMINATION: Magnetic resonance imaging (MRI) of the brain and brainstem 

without contrast 10/11/2019 2:00 PM

 

HISTORY: Confusion. Unsteady gait with multiple falls

 

TECHNIQUE: Multiplanar multi-weighted MRI of the brain and brainstem was 

performed without intravenous contrast using the general brain protocol.

 

COMPARISON: None available.

 

FINDINGS:

 

The scalp and calvarium are normal. The superior sagittal sinus 

demonstrates normal venous flow.  The corpus callosum is normal in shape 

and signal intensity. The posterior fossa is unremarkable.  The pituitary 

and sella are normal.  The brainstem and craniocervical junction are 

unremarkable. There are T2/FLAIR signal hyperintense foci in the 

periventricular and subcortical white matter most suggestive of mild 

chronic small vessel ischemic changes.

 

 

Diffusion weighted images reveal no hyperintensities to suggest acute 

cerebral infarction. The susceptibility weighted sequences reveal no 

evidence of acute or chronic hemorrhage. Ventricles, sulci and basal 

cisterns are prominent compatible with mild to moderate generalized 

cerebral volume loss. No hydrocephalus.

 

The paranasal sinuses are normal.  The visualized portions of the mastoids

are unremarkable. The orbits appear normal with exception of right lens 

replacement.  Normal flow voids are demonstrated in the carotid arteries 

and basilar artery.

 

IMPRESSION:

 

 

1. No evidence for acute or subacute ischemia.

2. There are T2/FLAIR signal hyperintense foci in the periventricular and 

subcortical white matter most suggestive of mild chronic small vessel 

ischemic changes.

3. Mild to moderate generalized cerebral volume loss.

 

Electronically signed by: Erika Riojas MD (10/11/2019 3:24 PM) 

[id number]